# Patient Record
Sex: FEMALE | Race: BLACK OR AFRICAN AMERICAN | NOT HISPANIC OR LATINO | Employment: FULL TIME | URBAN - METROPOLITAN AREA
[De-identification: names, ages, dates, MRNs, and addresses within clinical notes are randomized per-mention and may not be internally consistent; named-entity substitution may affect disease eponyms.]

---

## 2023-04-02 ENCOUNTER — OFFICE VISIT (OUTPATIENT)
Dept: URGENT CARE | Facility: CLINIC | Age: 48
End: 2023-04-02
Payer: COMMERCIAL

## 2023-04-02 VITALS
OXYGEN SATURATION: 98 % | HEART RATE: 89 BPM | TEMPERATURE: 100 F | RESPIRATION RATE: 18 BRPM | HEIGHT: 65 IN | DIASTOLIC BLOOD PRESSURE: 85 MMHG | WEIGHT: 151.81 LBS | BODY MASS INDEX: 25.29 KG/M2 | SYSTOLIC BLOOD PRESSURE: 119 MMHG

## 2023-04-02 DIAGNOSIS — M25.562 ACUTE PAIN OF LEFT KNEE: Primary | ICD-10-CM
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
## 2023-04-02 PROCEDURE — 96372 PR INJECTION,THERAP/PROPH/DIAG2ST, IM OR SUBCUT: ICD-10-PCS | Mod: S$GLB,,, | Performed by: NURSE PRACTITIONER

## 2023-04-02 PROCEDURE — 99204 PR OFFICE/OUTPT VISIT, NEW, LEVL IV, 45-59 MIN: ICD-10-PCS | Mod: 25,S$GLB,, | Performed by: NURSE PRACTITIONER

## 2023-04-02 PROCEDURE — 73562 X-RAY EXAM OF KNEE 3: CPT | Mod: LT,S$GLB,, | Performed by: RADIOLOGY

## 2023-04-02 PROCEDURE — 96372 THER/PROPH/DIAG INJ SC/IM: CPT | Mod: S$GLB,,, | Performed by: NURSE PRACTITIONER

## 2023-04-02 PROCEDURE — 73562 XR KNEE 3 VIEW LEFT: ICD-10-PCS | Mod: LT,S$GLB,, | Performed by: RADIOLOGY

## 2023-04-02 PROCEDURE — 99204 OFFICE O/P NEW MOD 45 MIN: CPT | Mod: 25,S$GLB,, | Performed by: NURSE PRACTITIONER

## 2023-04-02 RX ORDER — CELECOXIB 200 MG/1
200 CAPSULE ORAL 2 TIMES DAILY PRN
Qty: 14 CAPSULE | Refills: 0 | Status: SHIPPED | OUTPATIENT
Start: 2023-04-02 | End: 2023-04-09

## 2023-04-02 RX ORDER — KETOROLAC TROMETHAMINE 30 MG/ML
30 INJECTION, SOLUTION INTRAMUSCULAR; INTRAVENOUS
Status: COMPLETED | OUTPATIENT
Start: 2023-04-02 | End: 2023-04-02

## 2023-04-02 RX ADMIN — KETOROLAC TROMETHAMINE 30 MG: 30 INJECTION, SOLUTION INTRAMUSCULAR; INTRAVENOUS at 03:04
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
## 2023-06-26 ENCOUNTER — HOSPITAL ENCOUNTER (EMERGENCY)
Facility: HOSPITAL | Age: 48
Discharge: HOME | End: 2023-06-26
Attending: EMERGENCY MEDICINE
Payer: COMMERCIAL

## 2023-06-26 VITALS
RESPIRATION RATE: 18 BRPM | HEIGHT: 65 IN | BODY MASS INDEX: 24.32 KG/M2 | SYSTOLIC BLOOD PRESSURE: 141 MMHG | TEMPERATURE: 99 F | OXYGEN SATURATION: 97 % | WEIGHT: 146 LBS | HEART RATE: 61 BPM | DIASTOLIC BLOOD PRESSURE: 67 MMHG

## 2023-06-26 DIAGNOSIS — A08.4 VIRAL GASTROENTERITIS: Primary | ICD-10-CM

## 2023-06-26 DIAGNOSIS — R10.33 PERIUMBILICAL PAIN: ICD-10-CM

## 2023-06-26 LAB
ALBUMIN SERPL BCP-MCNC: 3.8 G/DL (ref 3.5–5.2)
ALP SERPL-CCNC: 56 U/L (ref 55–135)
ALT SERPL W/O P-5'-P-CCNC: 15 U/L (ref 10–44)
ANION GAP SERPL CALC-SCNC: 9 MMOL/L (ref 8–16)
AST SERPL-CCNC: 13 U/L (ref 10–40)
B-HCG UR QL: NEGATIVE
BACTERIA #/AREA URNS HPF: ABNORMAL /HPF
BASOPHILS # BLD AUTO: 0.04 K/UL (ref 0–0.2)
BASOPHILS NFR BLD: 0.4 % (ref 0–1.9)
BILIRUB SERPL-MCNC: 0.2 MG/DL (ref 0.1–1)
BILIRUB UR QL STRIP: NEGATIVE
BUN SERPL-MCNC: 10 MG/DL (ref 6–20)
CALCIUM SERPL-MCNC: 9.1 MG/DL (ref 8.7–10.5)
CHLORIDE SERPL-SCNC: 106 MMOL/L (ref 95–110)
CLARITY UR: ABNORMAL
CO2 SERPL-SCNC: 24 MMOL/L (ref 23–29)
COLOR UR: YELLOW
CREAT SERPL-MCNC: 0.8 MG/DL (ref 0.5–1.4)
DIFFERENTIAL METHOD: ABNORMAL
EOSINOPHIL # BLD AUTO: 0 K/UL (ref 0–0.5)
EOSINOPHIL NFR BLD: 0.2 % (ref 0–8)
ERYTHROCYTE [DISTWIDTH] IN BLOOD BY AUTOMATED COUNT: 16.2 % (ref 11.5–14.5)
EST. GFR  (NO RACE VARIABLE): >60 ML/MIN/1.73 M^2
GLUCOSE SERPL-MCNC: 104 MG/DL (ref 70–110)
GLUCOSE UR QL STRIP: NEGATIVE
HCT VFR BLD AUTO: 38.5 % (ref 37–48.5)
HGB BLD-MCNC: 12.2 G/DL (ref 12–16)
HGB UR QL STRIP: ABNORMAL
HYALINE CASTS #/AREA URNS LPF: 0 /LPF
IMM GRANULOCYTES # BLD AUTO: 0.02 K/UL (ref 0–0.04)
IMM GRANULOCYTES NFR BLD AUTO: 0.2 % (ref 0–0.5)
KETONES UR QL STRIP: NEGATIVE
LEUKOCYTE ESTERASE UR QL STRIP: NEGATIVE
LYMPHOCYTES # BLD AUTO: 1.2 K/UL (ref 1–4.8)
LYMPHOCYTES NFR BLD: 12.9 % (ref 18–48)
MCH RBC QN AUTO: 22.8 PG (ref 27–31)
MCHC RBC AUTO-ENTMCNC: 31.7 G/DL (ref 32–36)
MCV RBC AUTO: 72 FL (ref 82–98)
MICROSCOPIC COMMENT: ABNORMAL
MONOCYTES # BLD AUTO: 0.5 K/UL (ref 0.3–1)
MONOCYTES NFR BLD: 6 % (ref 4–15)
NEUTROPHILS # BLD AUTO: 7.3 K/UL (ref 1.8–7.7)
NEUTROPHILS NFR BLD: 80.3 % (ref 38–73)
NITRITE UR QL STRIP: NEGATIVE
NRBC BLD-RTO: 0 /100 WBC
PH UR STRIP: 6 [PH] (ref 5–8)
PLATELET # BLD AUTO: 296 K/UL (ref 150–450)
PMV BLD AUTO: 9.5 FL (ref 9.2–12.9)
POTASSIUM SERPL-SCNC: 4 MMOL/L (ref 3.5–5.1)
PROT SERPL-MCNC: 7 G/DL (ref 6–8.4)
PROT UR QL STRIP: ABNORMAL
RBC # BLD AUTO: 5.34 M/UL (ref 4–5.4)
RBC #/AREA URNS HPF: >100 /HPF (ref 0–4)
SODIUM SERPL-SCNC: 139 MMOL/L (ref 136–145)
SP GR UR STRIP: 1.03 (ref 1–1.03)
SQUAMOUS #/AREA URNS HPF: 7 /HPF
URN SPEC COLLECT METH UR: ABNORMAL
UROBILINOGEN UR STRIP-ACNC: NEGATIVE EU/DL
WBC # BLD AUTO: 9.04 K/UL (ref 3.9–12.7)
WBC #/AREA URNS HPF: 5 /HPF (ref 0–5)

## 2023-06-26 PROCEDURE — 96374 THER/PROPH/DIAG INJ IV PUSH: CPT

## 2023-06-26 PROCEDURE — 81000 URINALYSIS NONAUTO W/SCOPE: CPT | Performed by: EMERGENCY MEDICINE

## 2023-06-26 PROCEDURE — 96375 TX/PRO/DX INJ NEW DRUG ADDON: CPT

## 2023-06-26 PROCEDURE — 36415 COLL VENOUS BLD VENIPUNCTURE: CPT | Performed by: EMERGENCY MEDICINE

## 2023-06-26 PROCEDURE — 80053 COMPREHEN METABOLIC PANEL: CPT | Performed by: EMERGENCY MEDICINE

## 2023-06-26 PROCEDURE — 85025 COMPLETE CBC W/AUTO DIFF WBC: CPT | Performed by: EMERGENCY MEDICINE

## 2023-06-26 PROCEDURE — 63600175 PHARM REV CODE 636 W HCPCS: Performed by: EMERGENCY MEDICINE

## 2023-06-26 PROCEDURE — 81025 URINE PREGNANCY TEST: CPT | Performed by: EMERGENCY MEDICINE

## 2023-06-26 PROCEDURE — 96361 HYDRATE IV INFUSION ADD-ON: CPT

## 2023-06-26 PROCEDURE — 99284 EMERGENCY DEPT VISIT MOD MDM: CPT | Mod: 25

## 2023-06-26 RX ORDER — DIPHENHYDRAMINE HYDROCHLORIDE 50 MG/ML
25 INJECTION INTRAMUSCULAR; INTRAVENOUS
Status: COMPLETED | OUTPATIENT
Start: 2023-06-26 | End: 2023-06-26

## 2023-06-26 RX ORDER — ONDANSETRON HYDROCHLORIDE 8 MG/1
8 TABLET, FILM COATED ORAL EVERY 12 HOURS PRN
Qty: 8 TABLET | Refills: 1 | Status: SHIPPED | OUTPATIENT
Start: 2023-06-26 | End: 2024-03-28

## 2023-06-26 RX ORDER — KETOROLAC TROMETHAMINE 30 MG/ML
30 INJECTION, SOLUTION INTRAMUSCULAR; INTRAVENOUS
Status: COMPLETED | OUTPATIENT
Start: 2023-06-26 | End: 2023-06-26

## 2023-06-26 RX ORDER — HYOSCYAMINE SULFATE 0.5 MG/ML
0.5 INJECTION, SOLUTION SUBCUTANEOUS
Status: COMPLETED | OUTPATIENT
Start: 2023-06-26 | End: 2023-06-26

## 2023-06-26 RX ORDER — HYOSCYAMINE SULFATE 0.125 MG
375 TABLET ORAL EVERY 6 HOURS PRN
Qty: 20 TABLET | Refills: 1 | Status: SHIPPED | OUTPATIENT
Start: 2023-06-26 | End: 2024-03-28

## 2023-06-26 RX ORDER — DROPERIDOL 2.5 MG/ML
2.5 INJECTION, SOLUTION INTRAMUSCULAR; INTRAVENOUS
Status: COMPLETED | OUTPATIENT
Start: 2023-06-26 | End: 2023-06-26

## 2023-06-26 RX ADMIN — SODIUM CHLORIDE, POTASSIUM CHLORIDE, SODIUM LACTATE AND CALCIUM CHLORIDE 1000 ML: 600; 310; 30; 20 INJECTION, SOLUTION INTRAVENOUS at 08:06

## 2023-06-26 RX ADMIN — HYOSCYAMINE SULFATE 0.5 MG: 0.5 INJECTION, SOLUTION SUBCUTANEOUS at 08:06

## 2023-06-26 RX ADMIN — DIPHENHYDRAMINE HYDROCHLORIDE 25 MG: 50 INJECTION INTRAMUSCULAR; INTRAVENOUS at 08:06

## 2023-06-26 RX ADMIN — KETOROLAC TROMETHAMINE 30 MG: 30 INJECTION, SOLUTION INTRAMUSCULAR; INTRAVENOUS at 08:06

## 2023-06-26 RX ADMIN — DROPERIDOL 2.5 MG: 2.5 INJECTION, SOLUTION INTRAMUSCULAR; INTRAVENOUS at 08:06

## 2023-06-26 NOTE — ED PROVIDER NOTES
"Encounter Date: 6/26/2023    SCRIBE #1 NOTE: I, Michael Epperson, am scribing for, and in the presence of,  Salvatore Lopez III, MD.     History     Chief Complaint   Patient presents with    Abdominal Pain     Across lower abd. Started 0100 this am     Nausea    Vomiting     X 2 at 0430     Time seen by provider: 8:07 AM on 06/26/2023    Elena Ingram is a 48 y.o. female who presents to the ED with an onset of persistent lower abdominal pain, nausea, vomiting, and diarrhea that began 7 hours ago at 1 AM. She has taken aleve and ibuprofen without relief of her pain. She denies a history of abdominal surgeries, kidney stones, or diverticulitis. The patient denies dysuria, difficulty urinating, or any other symptoms at this time. She states it is not likely she is pregnant because she has underwent a tubal ligation. She states she is allergic to compazine because it makes her neck twitch. There is no recorded PMHx or PSHx.    The history is provided by the patient.   Review of patient's allergies indicates:   Allergen Reactions    Prochlorperazine Other (See Comments)     Other reaction(s): Muscle Pain, Unknown, Unknown  Pt reports drug as "Making my neck twitch".  neck twitching  "Makes her neck twitch"  Pt reports drug as "Making my neck twitch".  Pt reports drug as "Making my neck twitch".  "Makes her neck twitch"  Pt reports drug as "Making my neck twitch".      Oxycodone-acetaminophen Nausea And Vomiting and Other (See Comments)     No past medical history on file.  No past surgical history on file.  No family history on file.     Review of Systems   Constitutional:  Negative for activity change, appetite change, chills, fatigue and fever.   Eyes:  Negative for visual disturbance.   Respiratory:  Negative for apnea and shortness of breath.    Cardiovascular:  Negative for chest pain and palpitations.   Gastrointestinal:  Positive for abdominal pain, diarrhea, nausea and vomiting. Negative for abdominal " distention.   Genitourinary:  Negative for difficulty urinating and dysuria.   Musculoskeletal:  Negative for neck pain.   Skin:  Negative for pallor and rash.   Neurological:  Negative for headaches.   Hematological:  Does not bruise/bleed easily.   Psychiatric/Behavioral:  Negative for agitation.      Physical Exam     Initial Vitals [06/26/23 0753]   BP Pulse Resp Temp SpO2   (!) 141/67 61 18 98.6 °F (37 °C) 97 %      MAP       --         Physical Exam    Nursing note and vitals reviewed.  Constitutional: She appears well-developed and well-nourished.   HENT:   Head: Normocephalic and atraumatic.   Eyes: Conjunctivae are normal.   Neck: Neck supple.   Normal range of motion.  Cardiovascular:  Normal rate, regular rhythm and normal heart sounds.     Exam reveals no gallop and no friction rub.       No murmur heard.  Pulmonary/Chest: Effort normal and breath sounds normal. No respiratory distress. She has no wheezes. She has no rhonchi. She has no rales.   Abdominal: Abdomen is soft. She exhibits no distension. There is abdominal tenderness in the right lower quadrant and left lower quadrant.   No right CVA tenderness.  No left CVA tenderness. There is no rebound and no guarding.   Musculoskeletal:         General: Normal range of motion.      Cervical back: Normal range of motion and neck supple.     Neurological: She is alert and oriented to person, place, and time.   Skin: Skin is warm and dry. No erythema.   Psychiatric: She has a normal mood and affect.       ED Course   Procedures  Labs Reviewed   URINALYSIS, REFLEX TO URINE CULTURE - Abnormal; Notable for the following components:       Result Value    Appearance, UA Cloudy (*)     Protein, UA 1+ (*)     Occult Blood UA 3+ (*)     All other components within normal limits    Narrative:     Specimen Source->Urine   CBC W/ AUTO DIFFERENTIAL - Abnormal; Notable for the following components:    MCV 72 (*)     MCH 22.8 (*)     MCHC 31.7 (*)     RDW 16.2 (*)      Gran % 80.3 (*)     Lymph % 12.9 (*)     All other components within normal limits   URINALYSIS MICROSCOPIC - Abnormal; Notable for the following components:    RBC, UA >100 (*)     All other components within normal limits    Narrative:     Specimen Source->Urine   COMPREHENSIVE METABOLIC PANEL   PREGNANCY TEST, URINE RAPID    Narrative:     Specimen Source->Urine          Imaging Results              CT Abdomen Pelvis  Without Contrast (Final result)  Result time 06/26/23 09:06:42      Final result by Osbaldo Benson MD (06/26/23 09:06:42)                   Impression:      No acute findings in the abdomen including normal appendix      Electronically signed by: Osbaldo Benson  Date:    06/26/2023  Time:    09:06               Narrative:    EXAMINATION:  CT ABDOMEN PELVIS WITHOUT CONTRAST    CLINICAL HISTORY:  RLQ abdominal pain (Age >= 14y); Periumbilical pain    TECHNIQUE:  Low dose axial images, sagittal and coronal reformations were obtained from the lung bases to the pubic symphysis.  Oral contrast was not administered.    COMPARISON:  None    FINDINGS:  There is some nonspecific cystic spaces in the lung bases.  Normal size heart.  Unremarkable gallbladder.    Solid abdominal organs including liver spleen pancreas adrenal glands and kidneys are grossly unremarkable on this non IV contrasted exam.    There is no enteric contrast which limits bowel assessment.  No dilated bowel loops.  Normal appendix.    Essure coils.  Decompressed urinary bladder.  Trace low density pelvic free fluid in the posterior cul-de-sac.    No acute osseous findings.                                       Medications   lactated ringers bolus 1,000 mL (1,000 mLs Intravenous New Bag 6/26/23 0840)   diphenhydrAMINE injection 25 mg (25 mg Intravenous Given 6/26/23 0839)   ketorolac injection 30 mg (30 mg Intravenous Given 6/26/23 0839)   hyoscyamine injection 0.5 mg (0.5 mg Intravenous Given 6/26/23 0839)   droPERidol injection 2.5  mg (2.5 mg Intravenous Given 6/26/23 0812)     Medical Decision Making:   History:   Old Medical Records: I decided to obtain old medical records.  Clinical Tests:   Lab Tests: Ordered and Reviewed  ED Management:  48-year-old female presents with nausea, vomiting and diffuse crampy abdominal pain.  CT of the abdomen and pelvis fails to demonstrate any acute intra-abdominal pathology.  The symptoms appear to reflect a viral gastroenteritis.  There is no evidence of bowel obstruction, diverticulitis or appendicitis.     APC / Resident Notes:   I, Dr. Salvatore Lopez III, personally performed the services described in this documentation. All medical record entries made by the scribe were at my direction and in my presence.  I have reviewed the chart and agree that the record reflects my personal performance and is accurate and complete   Scribe Attestation:   Scribe #1: I performed the above scribed service and the documentation accurately describes the services I performed. I attest to the accuracy of the note.                   Clinical Impression:   Final diagnoses:  [R10.33] Periumbilical pain  [A08.4] Viral gastroenteritis (Primary)        ED Disposition Condition    Discharge Stable          ED Prescriptions       Medication Sig Dispense Start Date End Date Auth. Provider    hyoscyamine (ANASPAZ,LEVSIN) 0.125 mg Tab Take 3 tablets (375 mcg total) by mouth every 6 (six) hours as needed. 20 tablet 6/26/2023 7/26/2023 Salvatore Lopez III, MD    ondansetron (ZOFRAN) 8 MG tablet Take 1 tablet (8 mg total) by mouth every 12 (twelve) hours as needed for Nausea. 8 tablet 6/26/2023 -- Salvatore Lopez III, MD          Follow-up Information       Follow up With Specialties Details Why Contact Info    Juaquin Diane MD Family Medicine In 3 days  [address] [facility]  [place] [address]  [phone]               Salvatore Lopez III, MD  06/26/23 1829

## 2023-06-27 NOTE — PATIENT INSTRUCTIONS
Rest, and elevate right ankle until better  Apply ice for 15 minutes every 2 hours. After 48 hours, may use moist heat or heating pad  Wear knee immobilizer at all times for comfort and stability.  You may remove it as needed for showering and then reapply  Start Celebrex as needed for pain.  Do not take any additional NSAIDs while taking this.  You may take additional Tylenol as needed  Follow up with PCP and orthopedics for re-evaluation and referral as needed  Go directly to the ER for any emergent concerns  Return to clinic as needed for new or worse symptoms.     Doxepin Pregnancy And Lactation Text: This medication is Pregnancy Category C and it isn't known if it is safe during pregnancy. It is also excreted in breast milk and breast feeding isn't recommended.

## 2023-12-24 ENCOUNTER — OFFICE VISIT (OUTPATIENT)
Dept: URGENT CARE | Facility: CLINIC | Age: 48
End: 2023-12-24
Payer: COMMERCIAL

## 2023-12-24 VITALS
OXYGEN SATURATION: 97 % | TEMPERATURE: 98 F | RESPIRATION RATE: 18 BRPM | BODY MASS INDEX: 25.33 KG/M2 | DIASTOLIC BLOOD PRESSURE: 67 MMHG | WEIGHT: 152 LBS | HEIGHT: 65 IN | SYSTOLIC BLOOD PRESSURE: 115 MMHG | HEART RATE: 80 BPM

## 2023-12-24 DIAGNOSIS — B00.1 FEVER BLISTER: ICD-10-CM

## 2023-12-24 DIAGNOSIS — B96.89 ACUTE BACTERIAL SINUSITIS: ICD-10-CM

## 2023-12-24 DIAGNOSIS — R05.9 COUGH, UNSPECIFIED TYPE: Primary | ICD-10-CM

## 2023-12-24 DIAGNOSIS — J01.90 ACUTE BACTERIAL SINUSITIS: ICD-10-CM

## 2023-12-24 LAB
CTP QC/QA: YES
CTP QC/QA: YES
FLUAV AG NPH QL: NEGATIVE
FLUBV AG NPH QL: NEGATIVE
SARS-COV-2 AG RESP QL IA.RAPID: NEGATIVE

## 2023-12-24 PROCEDURE — 87804 INFLUENZA ASSAY W/OPTIC: CPT | Mod: QW,,, | Performed by: NURSE PRACTITIONER

## 2023-12-24 PROCEDURE — 87811 SARS-COV-2 COVID19 W/OPTIC: CPT | Mod: QW,S$GLB,, | Performed by: EMERGENCY MEDICINE

## 2023-12-24 PROCEDURE — 99214 PR OFFICE/OUTPT VISIT, EST, LEVL IV, 30-39 MIN: ICD-10-PCS | Mod: 25,S$GLB,, | Performed by: EMERGENCY MEDICINE

## 2023-12-24 PROCEDURE — 87811 SARS CORONAVIRUS 2 ANTIGEN POCT, MANUAL READ: ICD-10-PCS | Mod: QW,S$GLB,, | Performed by: EMERGENCY MEDICINE

## 2023-12-24 PROCEDURE — 99214 OFFICE O/P EST MOD 30 MIN: CPT | Mod: 25,S$GLB,, | Performed by: EMERGENCY MEDICINE

## 2023-12-24 PROCEDURE — 87804 POCT INFLUENZA A/B: ICD-10-PCS | Mod: QW,,, | Performed by: NURSE PRACTITIONER

## 2023-12-24 RX ORDER — BENZONATATE 100 MG/1
100 CAPSULE ORAL 3 TIMES DAILY PRN
Qty: 30 CAPSULE | Refills: 0 | Status: SHIPPED | OUTPATIENT
Start: 2023-12-24 | End: 2024-01-03

## 2023-12-24 RX ORDER — AMOXICILLIN AND CLAVULANATE POTASSIUM 875; 125 MG/1; MG/1
1 TABLET, FILM COATED ORAL EVERY 12 HOURS
Qty: 14 TABLET | Refills: 0 | Status: SHIPPED | OUTPATIENT
Start: 2023-12-24 | End: 2023-12-31

## 2023-12-24 RX ORDER — AZELASTINE 1 MG/ML
1 SPRAY, METERED NASAL 2 TIMES DAILY
Qty: 30 ML | Refills: 0 | Status: SHIPPED | OUTPATIENT
Start: 2023-12-24 | End: 2024-03-28

## 2023-12-24 RX ORDER — FLUTICASONE PROPIONATE 50 MCG
1 SPRAY, SUSPENSION (ML) NASAL DAILY
Qty: 15.8 ML | Refills: 0 | Status: SHIPPED | OUTPATIENT
Start: 2023-12-24 | End: 2024-03-28

## 2023-12-24 RX ORDER — VALACYCLOVIR HYDROCHLORIDE 1 G/1
2000 TABLET, FILM COATED ORAL EVERY 12 HOURS
Qty: 4 TABLET | Refills: 0 | Status: ON HOLD | OUTPATIENT
Start: 2023-12-24 | End: 2024-04-01 | Stop reason: HOSPADM

## 2023-12-24 NOTE — PATIENT INSTRUCTIONS
Augmentin twice a day for 7 days.      Please take a probiotic while you are on antibiotics plus a few weeks. Examples of probiotics include brand names such as Align, Floraster and Culturelle, but generic versions are ok too. Kefir is a milk product that is also an excellent probiotic and can be taken as 1/4 cup three times a day with meals.      Symptomatic treatment to include:    Rest, increase fluid intake to thin mucus, include electrolyte replacement  Ibuprofen/Tylenol as directed for fever, sore throat, body aches  Zrytec 10 mg daily until prescription complete  Flonase daily until bottle empty  Astelin twice daily until congestion resolves, then just as needed  guaifenesin 1200 mg twice daily for 10 days to thin mucus  Tessalon Perles cough pills best use for cough caused by postnasal drip/throat irritation  Refrain from using Afrin nasal spray or any decongestants as this will thicken mucous   Nasal saline spray several times a day  or nasal wash systems  Warm, salt water gargles, over the counter throat lozenges or sprays for sore throat.

## 2023-12-24 NOTE — PROGRESS NOTES
"Subjective:      Patient ID: Elena Ingram is a 48 y.o. female.    Vitals:  height is 5' 5" (1.651 m) and weight is 68.9 kg (152 lb). Her oral temperature is 98.1 °F (36.7 °C). Her blood pressure is 115/67 and her pulse is 80. Her respiration is 18 and oxygen saturation is 97%.     Chief Complaint: Cough and Nasal Congestion    Cough  This is a new problem. Episode onset: 2 weeks. The problem has been unchanged. The problem occurs hourly. The cough is Productive of purulent sputum. Associated symptoms include nasal congestion, postnasal drip and a sore throat. Pertinent negatives include no chills, ear pain or fever. Nothing aggravates the symptoms. She has tried OTC cough suppressant for the symptoms. The treatment provided mild relief.       Constitution: Negative for chills, fatigue and fever.   HENT:  Positive for congestion, postnasal drip and sore throat. Negative for ear pain.    Respiratory:  Positive for cough (Worse at night and 1st thing in the morning) and sputum production (green).    Gastrointestinal:  Negative for vomiting and diarrhea.      Objective:     Physical Exam   Constitutional: She is oriented to person, place, and time. She is cooperative.  Non-toxic appearance. She does not appear ill. No distress. awake  HENT:   Head: Normocephalic and atraumatic.   Ears:   Right Ear: Tympanic membrane, external ear and ear canal normal.   Left Ear: Tympanic membrane, external ear and ear canal normal.   Nose: Congestion present. No rhinorrhea.   Mouth/Throat: Mucous membranes are moist. Posterior oropharyngeal erythema present. No oropharyngeal exudate.   Eyes: Conjunctivae are normal. Right eye exhibits no discharge. Left eye exhibits no discharge.   Neck: Neck supple. No neck rigidity present.   Cardiovascular: Normal rate, regular rhythm and normal heart sounds.   Pulmonary/Chest: Effort normal and breath sounds normal. No accessory muscle usage. No tachypnea. No respiratory distress. She has no " wheezes. She has no rhonchi. She has no rales. She exhibits no tenderness.   Abdominal: Normal appearance.   Musculoskeletal:      Cervical back: She exhibits no tenderness.   Lymphadenopathy:     She has no cervical adenopathy.   Neurological: no focal deficit. She is alert and oriented to person, place, and time. No sensory deficit.   Skin: Skin is warm, dry, not diaphoretic and no rash. Capillary refill takes 2 to 3 seconds.   Psychiatric: Her behavior is normal. Mood normal.   Nursing note and vitals reviewed.      Assessment:     1. Cough, unspecified type    2. Acute bacterial sinusitis    3. Fever blister      COVID negative  Flu a/B negative    Plan:       Cough, unspecified type  -     POCT Influenza A/B  -     SARS Coronavirus 2 Antigen, POCT Manual Read    Acute bacterial sinusitis  -     azelastine (ASTELIN) 137 mcg (0.1 %) nasal spray; 1 spray (137 mcg total) by Nasal route 2 (two) times daily.  Dispense: 30 mL; Refill: 0  -     fluticasone propionate (FLONASE) 50 mcg/actuation nasal spray; 1 spray (50 mcg total) by Each Nostril route once daily.  Dispense: 15.8 mL; Refill: 0  -     benzonatate (TESSALON) 100 MG capsule; Take 1 capsule (100 mg total) by mouth 3 (three) times daily as needed for Cough.  Dispense: 30 capsule; Refill: 0  -     amoxicillin-clavulanate 875-125mg (AUGMENTIN) 875-125 mg per tablet; Take 1 tablet by mouth every 12 (twelve) hours. for 7 days  Dispense: 14 tablet; Refill: 0    Fever blister  -     valACYclovir (VALTREX) 1000 MG tablet; Take 2 tablets (2,000 mg total) by mouth every 12 (twelve) hours.  Dispense: 4 tablet; Refill: 0

## 2024-03-25 ENCOUNTER — HOSPITAL ENCOUNTER (EMERGENCY)
Facility: HOSPITAL | Age: 49
Discharge: HOME | End: 2024-03-25
Attending: EMERGENCY MEDICINE
Payer: COMMERCIAL

## 2024-03-25 VITALS
HEART RATE: 72 BPM | HEIGHT: 65 IN | WEIGHT: 152 LBS | OXYGEN SATURATION: 98 % | DIASTOLIC BLOOD PRESSURE: 78 MMHG | TEMPERATURE: 99 F | RESPIRATION RATE: 18 BRPM | SYSTOLIC BLOOD PRESSURE: 123 MMHG | BODY MASS INDEX: 25.33 KG/M2

## 2024-03-25 DIAGNOSIS — N85.8 ABNORMAL COLLECTION OF FLUID IN UTERINE CAVITY: Primary | ICD-10-CM

## 2024-03-25 LAB
ALBUMIN SERPL BCP-MCNC: 3.9 G/DL (ref 3.5–5.2)
ALP SERPL-CCNC: 59 U/L (ref 55–135)
ALT SERPL W/O P-5'-P-CCNC: 15 U/L (ref 10–44)
ANION GAP SERPL CALC-SCNC: 11 MMOL/L (ref 8–16)
AST SERPL-CCNC: 12 U/L (ref 10–40)
B-HCG UR QL: NEGATIVE
BASOPHILS # BLD AUTO: 0.03 K/UL (ref 0–0.2)
BASOPHILS NFR BLD: 0.3 % (ref 0–1.9)
BILIRUB SERPL-MCNC: 0.3 MG/DL (ref 0.1–1)
BILIRUB UR QL STRIP: NEGATIVE
BUN SERPL-MCNC: 9 MG/DL (ref 6–20)
CALCIUM SERPL-MCNC: 9.7 MG/DL (ref 8.7–10.5)
CHLORIDE SERPL-SCNC: 103 MMOL/L (ref 95–110)
CLARITY UR: CLEAR
CO2 SERPL-SCNC: 21 MMOL/L (ref 23–29)
COLOR UR: COLORLESS
CREAT SERPL-MCNC: 0.8 MG/DL (ref 0.5–1.4)
CTP QC/QA: YES
DIFFERENTIAL METHOD BLD: ABNORMAL
EOSINOPHIL # BLD AUTO: 0 K/UL (ref 0–0.5)
EOSINOPHIL NFR BLD: 0 % (ref 0–8)
ERYTHROCYTE [DISTWIDTH] IN BLOOD BY AUTOMATED COUNT: 15.7 % (ref 11.5–14.5)
EST. GFR  (NO RACE VARIABLE): >60 ML/MIN/1.73 M^2
GLUCOSE SERPL-MCNC: 134 MG/DL (ref 70–110)
GLUCOSE UR QL STRIP: NEGATIVE
HCT VFR BLD AUTO: 41.4 % (ref 37–48.5)
HGB BLD-MCNC: 13.2 G/DL (ref 12–16)
HGB UR QL STRIP: NEGATIVE
IMM GRANULOCYTES # BLD AUTO: 0.01 K/UL (ref 0–0.04)
IMM GRANULOCYTES NFR BLD AUTO: 0.1 % (ref 0–0.5)
KETONES UR QL STRIP: NEGATIVE
LEUKOCYTE ESTERASE UR QL STRIP: NEGATIVE
LIPASE SERPL-CCNC: 26 U/L (ref 4–60)
LYMPHOCYTES # BLD AUTO: 1.3 K/UL (ref 1–4.8)
LYMPHOCYTES NFR BLD: 14.9 % (ref 18–48)
MCH RBC QN AUTO: 23 PG (ref 27–31)
MCHC RBC AUTO-ENTMCNC: 31.9 G/DL (ref 32–36)
MCV RBC AUTO: 72 FL (ref 82–98)
MONOCYTES # BLD AUTO: 0.5 K/UL (ref 0.3–1)
MONOCYTES NFR BLD: 5.4 % (ref 4–15)
NEUTROPHILS # BLD AUTO: 7.1 K/UL (ref 1.8–7.7)
NEUTROPHILS NFR BLD: 79.3 % (ref 38–73)
NITRITE UR QL STRIP: NEGATIVE
NRBC BLD-RTO: 0 /100 WBC
PH UR STRIP: 8 [PH] (ref 5–8)
PLATELET # BLD AUTO: 314 K/UL (ref 150–450)
PMV BLD AUTO: 9.4 FL (ref 9.2–12.9)
POTASSIUM SERPL-SCNC: 3.9 MMOL/L (ref 3.5–5.1)
PROT SERPL-MCNC: 7.8 G/DL (ref 6–8.4)
PROT UR QL STRIP: NEGATIVE
RBC # BLD AUTO: 5.75 M/UL (ref 4–5.4)
SODIUM SERPL-SCNC: 135 MMOL/L (ref 136–145)
SP GR UR STRIP: >1.03 (ref 1–1.03)
URN SPEC COLLECT METH UR: ABNORMAL
UROBILINOGEN UR STRIP-ACNC: NEGATIVE EU/DL
WBC # BLD AUTO: 8.97 K/UL (ref 3.9–12.7)

## 2024-03-25 PROCEDURE — 81003 URINALYSIS AUTO W/O SCOPE: CPT | Performed by: EMERGENCY MEDICINE

## 2024-03-25 PROCEDURE — 63600175 PHARM REV CODE 636 W HCPCS: Performed by: EMERGENCY MEDICINE

## 2024-03-25 PROCEDURE — 96375 TX/PRO/DX INJ NEW DRUG ADDON: CPT

## 2024-03-25 PROCEDURE — 81025 URINE PREGNANCY TEST: CPT | Performed by: EMERGENCY MEDICINE

## 2024-03-25 PROCEDURE — 25500020 PHARM REV CODE 255

## 2024-03-25 PROCEDURE — 36415 COLL VENOUS BLD VENIPUNCTURE: CPT | Performed by: EMERGENCY MEDICINE

## 2024-03-25 PROCEDURE — 99285 EMERGENCY DEPT VISIT HI MDM: CPT | Mod: 25

## 2024-03-25 PROCEDURE — 96374 THER/PROPH/DIAG INJ IV PUSH: CPT | Mod: 59

## 2024-03-25 PROCEDURE — 80053 COMPREHEN METABOLIC PANEL: CPT | Performed by: EMERGENCY MEDICINE

## 2024-03-25 PROCEDURE — 83690 ASSAY OF LIPASE: CPT | Performed by: EMERGENCY MEDICINE

## 2024-03-25 PROCEDURE — 85025 COMPLETE CBC W/AUTO DIFF WBC: CPT | Performed by: EMERGENCY MEDICINE

## 2024-03-25 RX ORDER — ONDANSETRON HYDROCHLORIDE 2 MG/ML
4 INJECTION, SOLUTION INTRAVENOUS
Status: COMPLETED | OUTPATIENT
Start: 2024-03-25 | End: 2024-03-25

## 2024-03-25 RX ORDER — ONDANSETRON 4 MG/1
4 TABLET, ORALLY DISINTEGRATING ORAL EVERY 6 HOURS PRN
Qty: 15 TABLET | Refills: 0 | Status: ON HOLD | OUTPATIENT
Start: 2024-03-25 | End: 2024-04-01 | Stop reason: HOSPADM

## 2024-03-25 RX ORDER — HYDROCODONE BITARTRATE AND ACETAMINOPHEN 5; 325 MG/1; MG/1
1 TABLET ORAL EVERY 6 HOURS PRN
Qty: 12 TABLET | Refills: 0 | Status: SHIPPED | OUTPATIENT
Start: 2024-03-25 | End: 2024-03-28

## 2024-03-25 RX ORDER — MORPHINE SULFATE 2 MG/ML
6 INJECTION, SOLUTION INTRAMUSCULAR; INTRAVENOUS
Status: COMPLETED | OUTPATIENT
Start: 2024-03-25 | End: 2024-03-25

## 2024-03-25 RX ADMIN — MORPHINE SULFATE 6 MG: 2 INJECTION, SOLUTION INTRAMUSCULAR; INTRAVENOUS at 09:03

## 2024-03-25 RX ADMIN — ONDANSETRON 4 MG: 2 INJECTION INTRAMUSCULAR; INTRAVENOUS at 09:03

## 2024-03-25 RX ADMIN — IOHEXOL 75 ML: 350 INJECTION, SOLUTION INTRAVENOUS at 09:03

## 2024-03-25 NOTE — ED PROVIDER NOTES
"Encounter Date: 3/25/2024       History     Chief Complaint   Patient presents with    Abdominal Pain     Rt. Lower  , started 12 midnight     Vomiting     HPI 49-year-old woman who presents emergency department for severe right lower quadrant abdominal pain that began around midnight last night.  She has had associated nausea and vomiting.  No diarrhea, dysuria or fever.  Review of patient's allergies indicates:   Allergen Reactions    Prochlorperazine Other (See Comments)     Other reaction(s): Muscle Pain, Unknown, Unknown  Pt reports drug as "Making my neck twitch".  neck twitching  "Makes her neck twitch"  Pt reports drug as "Making my neck twitch".  Pt reports drug as "Making my neck twitch".  "Makes her neck twitch"  Pt reports drug as "Making my neck twitch".      Oxycodone-acetaminophen Nausea And Vomiting and Other (See Comments)     No past medical history on file.  No past surgical history on file.  No family history on file.     Review of Systems   Constitutional:  Negative for fever.   HENT:  Negative for sore throat.    Respiratory:  Negative for shortness of breath.    Cardiovascular:  Negative for chest pain.   Gastrointestinal:  Positive for abdominal pain, nausea and vomiting.   Genitourinary:  Negative for dysuria.   Musculoskeletal:  Negative for back pain.   Skin:  Negative for rash.   Neurological:  Negative for weakness.   Hematological:  Does not bruise/bleed easily.       Physical Exam     Initial Vitals [03/25/24 0837]   BP Pulse Resp Temp SpO2   136/75 73 20 97.5 °F (36.4 °C) 99 %      MAP       --         Physical Exam    Constitutional: Vital signs are normal. She appears well-developed and well-nourished.  Non-toxic appearance. No distress.   HENT:   Head: Normocephalic and atraumatic.   Eyes: EOM are normal. Pupils are equal, round, and reactive to light.   Neck: Neck supple. No JVD present.   Normal range of motion.  Cardiovascular:  Normal rate, regular rhythm, normal heart sounds " and intact distal pulses.     Exam reveals no gallop and no friction rub.       No murmur heard.  Pulmonary/Chest: Breath sounds normal. She has no wheezes. She has no rhonchi. She has no rales.   Abdominal: Abdomen is soft. Bowel sounds are normal. There is abdominal tenderness in the right lower quadrant. There is tenderness at McBurney's point. There is no rebound, no guarding and negative Farias's sign. positive Rovsing's sign  Musculoskeletal:         General: Normal range of motion.      Cervical back: Normal range of motion and neck supple. No rigidity.     Neurological: She is alert and oriented to person, place, and time. She has normal strength and normal reflexes. No cranial nerve deficit or sensory deficit. She exhibits normal muscle tone. Coordination normal. GCS eye subscore is 4. GCS verbal subscore is 5. GCS motor subscore is 6.   Skin: Skin is warm and dry.   Psychiatric: She has a normal mood and affect. Her speech is normal and behavior is normal. She is not actively hallucinating.         ED Course   Procedures  Labs Reviewed   CBC W/ AUTO DIFFERENTIAL - Abnormal; Notable for the following components:       Result Value    RBC 5.75 (*)     MCV 72 (*)     MCH 23.0 (*)     MCHC 31.9 (*)     RDW 15.7 (*)     Gran % 79.3 (*)     Lymph % 14.9 (*)     All other components within normal limits   COMPREHENSIVE METABOLIC PANEL - Abnormal; Notable for the following components:    Sodium 135 (*)     CO2 21 (*)     Glucose 134 (*)     All other components within normal limits   URINALYSIS, REFLEX TO URINE CULTURE - Abnormal; Notable for the following components:    Color, UA Colorless (*)     Specific Gravity, UA >1.030 (*)     All other components within normal limits    Narrative:     Specimen Source->Urine   LIPASE   POCT URINE PREGNANCY          Imaging Results              US Pelvis Comp with Transvag NON-OB (xpd (Final result)  Result time 03/25/24 14:53:35      Final result by Blaine Dillon Jr.,  MD (03/25/24 14:53:35)                   Impression:      There is a complex part solid and part fluid collection identified in the endometrial cavity measuring 3.2 cm by 2.5 cm.  In the fluid component there is a an 8 mm polyp.  The echogenic solid material is suspicious for neoplasm.  No evidence of ovarian torsion is noted.      Electronically signed by: Blaine Dillon MD  Date:    03/25/2024  Time:    14:53               Narrative:    EXAMINATION:  US PELVIS COMP WITH TRANSVAG NON-OB (XPD)    CLINICAL HISTORY:  rlq pain. CT neg. r/o torsion;    TECHNIQUE:  Transabdominal sonography of the pelvis was performed, followed by transvaginal sonography to better evaluate the uterus and ovaries.    COMPARISON:  None.    FINDINGS:  Uterus:    Size: 9.2 x 7.4 cm    Masses: In the lumen there is a collection of fluid and solid material which is rather echogenic.  Within the fluid collection is a small polyp measuring 8 mm in size length.  A small echogenic probable calcification is identified in the myometrium.    Endometrium: Elsewhere the endometrial lining is narrow measuring 2.5 mm    Right ovary:    Size: 3.3 x 3.1 cm    Appearance: Normal    Vascular flow: Normal.    Left ovary:    Size: 2.9 x 2 cm    Appearance: Normal    Vascular Flow: Normal.    Free Fluid:    None.                                       CT Abdomen Pelvis With IV Contrast NO Oral Contrast (Final result)  Result time 03/25/24 11:36:35      Final result by Viki Benitez MD (03/25/24 11:36:35)                   Impression:      Uterus appears enlarged with the central endometrial canal thick.  Contraceptive devices as described.  Bilateral ovarian cysts.  Trace free fluid the pelvis not more so than can be seen on a physiologic basis.  Normal appearance of the appendix    Suggest follow-up ultrasound      Electronically signed by: Viki Benitez MD  Date:    03/25/2024  Time:    11:36               Narrative:    EXAMINATION:  CT ABDOMEN PELVIS  WITH IV CONTRAST    CLINICAL HISTORY:  RLQ abdominal pain (Age >= 14y);    TECHNIQUE:  Low dose axial images, sagittal and coronal reformations were obtained from the lung bases to the pubic symphysis following the IV administration of 75 mL of Omnipaque 350 and no p.o. contrast    COMPARISON:  06/26/2023    FINDINGS:  Dependent hypoventilatory change and small bladder cyst in the right posterior lung base similar in appearance to the prior study    Liver and spleen unremarkable appearance.  No calcified stones in the gallbladder or CT findings of acute cholecystitis.  No biliary duct dilatation.  Pancreas and adrenal glands unremarkable appearance.  The abdominal aorta tapers without aneurysmal dilatation    Very tiny fat containing umbilical hernia    Normal appearance of the appendix.  No free intraperitoneal air.  Trace free fluid the pelvis.    Renal enhancement is symmetrical and there is no hydronephrosis.  Small subcentimeter hypodensity in the right kidney suggesting cyst.  Urinary bladder is mildly distended at time of the exam and as visualized is unremarkable appearance    Reproductive organs; enlarged uterus.  Endometrial canal also appears thickened and uterus slightly heterogeneous.  Endometrial canal measured approximately 1.8 cm.  Tubal ligation structures suggesting Essure devices appear to extend into the uterus bilaterally particularly.  This is not significantly changed.  2.9 cm oval right adnexal cyst and 1.6 cm left adnexal cyst suggesting bilateral ovarian cyst    Osseous structures show mild degenerative changes without obvious aggressive appearing osseous lesion                                       Medications   morphine injection 6 mg (6 mg Intravenous Given 3/25/24 0920)   ondansetron injection 4 mg (4 mg Intravenous Given 3/25/24 0921)   iohexoL (OMNIPAQUE 350) 350 mg iodine/mL injection (75 mLs Intravenous Given 3/25/24 0952)     Medical Decision Making  49-year-old woman who presents  emergency department for severe right lower quadrant abdominal pain that began around midnight last night.  She has had associated nausea and vomiting.  No diarrhea, dysuria or fever.  On examination positive Rosving's sign and tenderness over McBurney's point.  Differential diagnosis includes acute appendicitis, ovarian torsion, urinary tract infection, ureterolithiasis.  UPT negative, urinalysis is nitrite and leukocyte negative, no evidence of infection.  CT abdomen pelvis and ultrasound of the pelvis was performed showing a complex fluid collection in the endometrial cavity with a 8 mm polyp and echogenic solid material suspicious for neoplasm.  Normal appendix.  Discussed with Gynecology Dr. Martins who will follow-up the patient closely.  Informed patient of imaging findings and need for close follow-up.  Will provide pain medication and antiemetics.  Discharged improved in no acute distress with OBGYN follow-up.    Amount and/or Complexity of Data Reviewed  Labs: ordered. Decision-making details documented in ED Course.  Radiology: ordered.    Risk  Prescription drug management.               ED Course as of 03/25/24 1800   Mon Mar 25, 2024   1227 Specific Gravity, UA(!): >1.030 [AS]   1227 NITRITE UA: Negative [AS]   1227 Leukocyte Esterase, UA: Negative [AS]   1227 Lipase: 26 [AS]   1227 Sodium(!): 135 [AS]   1227 Potassium: 3.9 [AS]   1227 CO2(!): 21 [AS]   1227 Glucose(!): 134 [AS]   1227 BUN: 9 [AS]   1227 Creatinine: 0.8 [AS]   1227 BILIRUBIN TOTAL: 0.3 [AS]   1227 WBC: 8.97 [AS]   1227 Hemoglobin: 13.2 [AS]   1227 Hematocrit: 41.4 [AS]   1227 Platelet Count: 314 [AS]      ED Course User Index  [AS] José Miguel Collins MD                           Clinical Impression:  Final diagnoses:  [N85.8] Abnormal collection of fluid in uterine cavity (Primary)          ED Disposition Condition    Discharge Stable          ED Prescriptions       Medication Sig Dispense Start Date End Date Auth. Provider     HYDROcodone-acetaminophen (NORCO) 5-325 mg per tablet Take 1 tablet by mouth every 6 (six) hours as needed for Pain. 12 tablet 3/25/2024 -- José Miguel Collins MD    ondansetron (ZOFRAN-ODT) 4 MG TbDL Take 1 tablet (4 mg total) by mouth every 6 (six) hours as needed (nausea). 15 tablet 3/25/2024 -- José Miguel Collins MD          Follow-up Information       Follow up With Specialties Details Why Contact Info Additional Information    Aquiles Martins MD Obstetrics and Gynecology Schedule an appointment as soon as possible for a visit  His nurse will reach out to you with an appointment soon. [address] [facility]  [address]  [facility] [address]  [phone]       [facility] -  Emergency Medicine  As needed, If symptoms worsen [address] [place] [address] [address] [place] [address] 1st floor             José Miguel Collins MD  03/25/24 1800

## 2024-03-25 NOTE — DISCHARGE INSTRUCTIONS
Your ultrasound was abnormal for a complex fluid collection that could be suspicious for possible cancer as well as a polyp in her uterus.  OBGYN is aware and will reach out to you soon for close follow-up and further instructions.

## 2024-03-26 ENCOUNTER — HOSPITAL ENCOUNTER (EMERGENCY)
Facility: HOSPITAL | Age: 49
Discharge: HOME | End: 2024-03-26
Attending: EMERGENCY MEDICINE
Payer: COMMERCIAL

## 2024-03-26 ENCOUNTER — TELEPHONE (OUTPATIENT)
Dept: OBSTETRICS AND GYNECOLOGY | Facility: CLINIC | Age: 49
End: 2024-03-26
Payer: COMMERCIAL

## 2024-03-26 ENCOUNTER — PATIENT MESSAGE (OUTPATIENT)
Dept: INTERNAL MEDICINE | Facility: CLINIC | Age: 49
End: 2024-03-26
Payer: COMMERCIAL

## 2024-03-26 VITALS
DIASTOLIC BLOOD PRESSURE: 68 MMHG | HEART RATE: 87 BPM | BODY MASS INDEX: 25.29 KG/M2 | WEIGHT: 152 LBS | OXYGEN SATURATION: 97 % | SYSTOLIC BLOOD PRESSURE: 118 MMHG | TEMPERATURE: 98 F | RESPIRATION RATE: 16 BRPM

## 2024-03-26 DIAGNOSIS — R10.30 LOWER ABDOMINAL PAIN: Primary | ICD-10-CM

## 2024-03-26 PROCEDURE — 63600175 PHARM REV CODE 636 W HCPCS: Performed by: EMERGENCY MEDICINE

## 2024-03-26 PROCEDURE — 25000003 PHARM REV CODE 250: Performed by: EMERGENCY MEDICINE

## 2024-03-26 PROCEDURE — 99284 EMERGENCY DEPT VISIT MOD MDM: CPT | Mod: 25

## 2024-03-26 PROCEDURE — 96372 THER/PROPH/DIAG INJ SC/IM: CPT | Performed by: EMERGENCY MEDICINE

## 2024-03-26 RX ORDER — HYDROMORPHONE HYDROCHLORIDE 2 MG/1
2 TABLET ORAL EVERY 4 HOURS PRN
Qty: 12 TABLET | Refills: 0 | Status: SHIPPED | OUTPATIENT
Start: 2024-03-26

## 2024-03-26 RX ORDER — DICLOFENAC SODIUM 75 MG/1
75 TABLET, DELAYED RELEASE ORAL 2 TIMES DAILY
Qty: 60 TABLET | Refills: 0 | Status: ON HOLD | OUTPATIENT
Start: 2024-03-26 | End: 2024-04-01 | Stop reason: HOSPADM

## 2024-03-26 RX ORDER — HYDROMORPHONE HYDROCHLORIDE 2 MG/1
2 TABLET ORAL
Status: COMPLETED | OUTPATIENT
Start: 2024-03-26 | End: 2024-03-26

## 2024-03-26 RX ORDER — KETOROLAC TROMETHAMINE 30 MG/ML
15 INJECTION, SOLUTION INTRAMUSCULAR; INTRAVENOUS
Status: COMPLETED | OUTPATIENT
Start: 2024-03-26 | End: 2024-03-26

## 2024-03-26 RX ADMIN — KETOROLAC TROMETHAMINE 15 MG: 30 INJECTION, SOLUTION INTRAMUSCULAR at 07:03

## 2024-03-26 RX ADMIN — HYDROMORPHONE HYDROCHLORIDE 2 MG: 2 TABLET ORAL at 07:03

## 2024-03-26 NOTE — FIRST PROVIDER EVALUATION
" Emergency Department TeleTriage Encounter Note      CHIEF COMPLAINT    Chief Complaint   Patient presents with    Abdominal Pain     Pt was seen yesterday for same complaint, states that her pain has gotten worse and the pain medications that were prescribed are not helping.  Pt also had an episode of blood in her urine.          VITAL SIGNS   Initial Vitals [03/26/24 1801]   BP Pulse Resp Temp SpO2   115/72 69 16 97.9 °F (36.6 °C) 97 %      MAP       --            ALLERGIES    Review of patient's allergies indicates:   Allergen Reactions    Prochlorperazine Other (See Comments)     Other reaction(s): Muscle Pain, Unknown, Unknown  Pt reports drug as "Making my neck twitch".  neck twitching  "Makes her neck twitch"  Pt reports drug as "Making my neck twitch".  Pt reports drug as "Making my neck twitch".  "Makes her neck twitch"  Pt reports drug as "Making my neck twitch".      Oxycodone-acetaminophen Nausea And Vomiting and Other (See Comments)       PROVIDER TRIAGE NOTE  Patient presents with complaint of continued lower abdominal pain.  Seen in the ED yesterday for the same.  Had CT and ultrasound obtained.  Is seeing OB gyn tomorrow.      Phy:   Constitutional: well nourished, well developed, appearing stated age, NAD        Initial orders will be placed and care will be transferred to an alternate provider when patient is roomed for a full evaluation. Any additional orders and the final disposition will be determined by that provider.        ORDERS  Labs Reviewed - No data to display    ED Orders (720h ago, onward)      None              Virtual Visit Note: The provider triage portion of this emergency department evaluation and documentation was performed via [employer], a HIPAA-compliant telemedicine application, in concert with a tele-presenter in the room. A face to face patient evaluation with one of my colleagues will occur once the patient is placed in an emergency department room.      DISCLAIMER: This " note was prepared with [employer] your brands voice recognition transcription software. Garbled syntax, mangled pronouns, and other bizarre constructions may be attributed to that software system.

## 2024-03-26 NOTE — TELEPHONE ENCOUNTER
----- Message from Saarh Crowe sent at 3/26/2024 10:44 AM CDT -----  Type:  Sooner Appointment Request    Caller is requesting a sooner appointment.  Caller declined first available appointment listed below.  Caller will not accept being placed on the waitlist and is requesting a message be sent to doctor.    Name of Caller:pt    When is the first available appointment?4/24    Symptoms:mass on uterus seen by the ER    Would the patient rather a call back or a response via [employer]? Call back    Best Call Back Number:[phone]      Additional Information: ER told the pt to be seen as soon as possible     Please call Back to advise. Thanks!

## 2024-03-27 ENCOUNTER — OFFICE VISIT (OUTPATIENT)
Dept: OBSTETRICS AND GYNECOLOGY | Facility: CLINIC | Age: 49
End: 2024-03-27
Payer: COMMERCIAL

## 2024-03-27 VITALS
DIASTOLIC BLOOD PRESSURE: 72 MMHG | WEIGHT: 152.56 LBS | HEIGHT: 65 IN | SYSTOLIC BLOOD PRESSURE: 114 MMHG | BODY MASS INDEX: 25.42 KG/M2

## 2024-03-27 DIAGNOSIS — R10.2 PELVIC PAIN: Primary | ICD-10-CM

## 2024-03-27 DIAGNOSIS — R93.5 ABNORMAL ULTRASOUND OF ENDOMETRIUM: ICD-10-CM

## 2024-03-27 DIAGNOSIS — N93.9 ABNORMAL UTERINE BLEEDING (AUB): ICD-10-CM

## 2024-03-27 PROCEDURE — 3008F BODY MASS INDEX DOCD: CPT | Mod: CPTII,S$GLB,, | Performed by: OBSTETRICS & GYNECOLOGY

## 2024-03-27 PROCEDURE — 1159F MED LIST DOCD IN RCRD: CPT | Mod: CPTII,S$GLB,, | Performed by: OBSTETRICS & GYNECOLOGY

## 2024-03-27 PROCEDURE — 99999 PR PBB SHADOW E&M-EST. PATIENT-LVL IV: CPT | Mod: PBBFAC,,, | Performed by: OBSTETRICS & GYNECOLOGY

## 2024-03-27 PROCEDURE — 99204 OFFICE O/P NEW MOD 45 MIN: CPT | Mod: S$GLB,,, | Performed by: OBSTETRICS & GYNECOLOGY

## 2024-03-27 PROCEDURE — 3078F DIAST BP <80 MM HG: CPT | Mod: CPTII,S$GLB,, | Performed by: OBSTETRICS & GYNECOLOGY

## 2024-03-27 PROCEDURE — 3074F SYST BP LT 130 MM HG: CPT | Mod: CPTII,S$GLB,, | Performed by: OBSTETRICS & GYNECOLOGY

## 2024-03-27 RX ORDER — MUPIROCIN 20 MG/G
OINTMENT TOPICAL
Status: CANCELLED | OUTPATIENT
Start: 2024-03-27

## 2024-03-27 RX ORDER — SODIUM CHLORIDE 9 MG/ML
INJECTION, SOLUTION INTRAVENOUS CONTINUOUS
Status: CANCELLED | OUTPATIENT
Start: 2024-03-27

## 2024-03-27 RX ORDER — CEFAZOLIN SODIUM 2 G/50ML
2 SOLUTION INTRAVENOUS
Status: CANCELLED | OUTPATIENT
Start: 2024-03-27

## 2024-03-27 NOTE — ED PROVIDER NOTES
"Encounter Date: 3/26/2024       History     Chief Complaint   Patient presents with    Abdominal Pain     Pt was seen yesterday for same complaint, states that her pain has gotten worse and the pain medications that were prescribed are not helping.  Pt also had an episode of blood in her urine.        HPI  49 y.o.   Co lower abd pain since Sunday   Was seen here yesterday, concern for pelvic mass   Sent home on medicines, states non controlling pain   One episode of emesis   Had some VB yesterday, not currently   Has fu with OB GYN tomorrow    Review of patient's allergies indicates:   Allergen Reactions    Prochlorperazine Other (See Comments)     Other reaction(s): Muscle Pain, Unknown, Unknown  Pt reports drug as "Making my neck twitch".  neck twitching  "Makes her neck twitch"  Pt reports drug as "Making my neck twitch".  Pt reports drug as "Making my neck twitch".  "Makes her neck twitch"  Pt reports drug as "Making my neck twitch".      Oxycodone-acetaminophen Nausea And Vomiting and Other (See Comments)     No past medical history on file.  No past surgical history on file.  No family history on file.     Review of Systems  All systems were reviewed/examined and were negative except as noted in the HPI.    Physical Exam     Initial Vitals [03/26/24 1801]   BP Pulse Resp Temp SpO2   115/72 69 16 97.9 °F (36.6 °C) 97 %      MAP       --         Physical Exam    General: the patient is awake, alert, and in no apparent distress.  Head: normocephalic and atraumatic, sclera are clear  Neck: supple without meningismus  Chest:no respiratory distress  Heart: regular rate and rhythm  ABD soft, mild RLQ t, nondistended, no peritoneal signs  Extremities: warm and well perfused  Skin: warm and dry  Psych conversant  Neuro: awake, alert, moving all extremities    ED Course   Procedures  Labs Reviewed - No data to display       Imaging Results    None          Medications   ketorolac injection 15 mg (15 mg Intramuscular " Given 3/26/24 1927)   HYDROmorphone tablet 2 mg (2 mg Oral Given 3/26/24 1927)     Medical Decision Making  Risk  Prescription drug management.      Medical Decision Making:    This is an emergent evaluation of a patient presenting to the ED.  Nursing notes were reviewed.    I decided to obtain and review old medical records, which showed: ED visit yesterday, CT scan, US    Analgesics given  Sig improvement in pain  Pt would like to try to go home and fu tomorrow  Rx    Evaluation for Emergency Medical Condition  The patient received a medical screening exam and within a reasonable degree of clinical confidence an emergency medical condition has not been identified.  The patient is instructed on proper follow up and return precautions to the ED.      Ricardo Farrell MD, SUSANA                                    Clinical Impression:  Final diagnoses:  [R10.30] Lower abdominal pain (Primary)          ED Disposition Condition    Discharge Stable          ED Prescriptions       Medication Sig Dispense Start Date End Date Auth. Provider    HYDROmorphone (DILAUDID) 2 MG tablet Take 1 tablet (2 mg total) by mouth every 4 (four) hours as needed for Pain. 12 tablet 3/26/2024 -- Luis Felipe Farrell MD    diclofenac (VOLTAREN) 75 MG EC tablet Take 1 tablet (75 mg total) by mouth 2 (two) times daily. 60 tablet 3/26/2024 -- Luis Felipe Farrell MD          Follow-up Information       Follow up With Specialties Details Why Contact Info    Aquiles Martins MD Obstetrics and Gynecology Schedule an appointment as soon as possible for a visit   [address] [facility]  [address] [place] [id number]  [phone]            Discharged to home in stable condition, return to ED warnings given, follow up and patient care instructions given.      Ricardo Farrell MD, SUSANA, [facility]  Department of Emergency Medicine       Luis Felipe Farrell MD  03/27/24 2065

## 2024-03-27 NOTE — PROGRESS NOTES
"  Subjective:   Chief Complaint:  Pelvic Pain       Patient ID: Elena Ingram is a  49 y.o. female.    Contraception: Essure    Date: 3/27/2024    The patient presents today due to the following:  Beginning on the evening of 2024 the patient reports initial right lower quadrant pain which has now radiated to a central location.    She was evaluated in the emergency room since that time on two occasions.  Prior to each occasion she had a single episode of vomiting  Beginning on 2024 she noted the onset of heavy irregular bleeding.    ED evaluation was compatible for an endometrial cavity lesion and the patient presents today to discuss further evaluation.    No previous significant gyn issues.  She reports a normal Pap smear in 2023 with no history of abnormal Pap smears.      GYN & OB History  Patient's last menstrual period was 2024 (approximate).     Date of Last Pap: No result found  OB History          5    Para   4    Term   4            AB   1    Living   3         SAB   1    IAB        Ectopic        Multiple        Live Births               Obstetric Comments   Vaginal Delivery x 4 (SIDS x 1)  SAB x1               Allergies:   Review of patient's allergies indicates:   Allergen Reactions    Prochlorperazine Other (See Comments)     Other reaction(s): Muscle Pain, Unknown, Unknown  Pt reports drug as "Making my neck twitch".  neck twitching  "Makes her neck twitch"  Pt reports drug as "Making my neck twitch".  Pt reports drug as "Making my neck twitch".  "Makes her neck twitch"  Pt reports drug as "Making my neck twitch".      Oxycodone-acetaminophen Nausea And Vomiting and Other (See Comments)       History reviewed. No pertinent past medical history.    Past Surgical History:   Procedure Laterality Date    Essure Bilateral 2015       Medications    Current Outpatient Medications:     HYDROmorphone (DILAUDID) 2 MG tablet, Take 1 tablet (2 mg total) by " mouth every 4 (four) hours as needed for Pain., Disp: 12 tablet, Rfl: 0    multivitamin with minerals tablet, Body, Hair, Skin and Nails, Disp: , Rfl:     valACYclovir (VALTREX) 1000 MG tablet, Take 2 tablets (2,000 mg total) by mouth every 12 (twelve) hours., Disp: 4 tablet, Rfl: 0    azelastine (ASTELIN) 137 mcg (0.1 %) nasal spray, 1 spray (137 mcg total) by Nasal route 2 (two) times daily. (Patient not taking: Reported on 3/27/2024), Disp: 30 mL, Rfl: 0    diclofenac (VOLTAREN) 75 MG EC tablet, Take 1 tablet (75 mg total) by mouth 2 (two) times daily. (Patient not taking: Reported on 3/27/2024), Disp: 60 tablet, Rfl: 0    fluticasone propionate (FLONASE) 50 mcg/actuation nasal spray, 1 spray (50 mcg total) by Each Nostril route once daily. (Patient not taking: Reported on 3/27/2024), Disp: 15.8 mL, Rfl: 0    HYDROcodone-acetaminophen (NORCO) 5-325 mg per tablet, Take 1 tablet by mouth every 6 (six) hours as needed for Pain. (Patient not taking: Reported on 3/27/2024), Disp: 12 tablet, Rfl: 0    hyoscyamine (ANASPAZ,LEVSIN) 0.125 mg Tab, Take 3 tablets (375 mcg total) by mouth every 6 (six) hours as needed., Disp: 20 tablet, Rfl: 1    ondansetron (ZOFRAN) 8 MG tablet, Take 1 tablet (8 mg total) by mouth every 12 (twelve) hours as needed for Nausea. (Patient not taking: Reported on 12/24/2023), Disp: 8 tablet, Rfl: 1    ondansetron (ZOFRAN-ODT) 4 MG TbDL, Take 1 tablet (4 mg total) by mouth every 6 (six) hours as needed (nausea). (Patient not taking: Reported on 3/27/2024), Disp: 15 tablet, Rfl: 0  No current facility-administered medications for this visit.     Social History     Tobacco Use    Smoking status: Never    Smokeless tobacco: Never       History reviewed. No pertinent family history.    Review of Systems (at today's evaluation)  Review of Systems   Constitutional:  Negative for fever and unexpected weight change.   Respiratory: Negative.     Cardiovascular:  Negative for chest pain and palpitations.    Gastrointestinal:  Positive for abdominal pain, nausea and vomiting.   Genitourinary:  Negative for dysuria, hematuria and urgency.        Gyn as per HPI   Neurological:  Negative for headaches.        Objective:      Vitals:    03/27/24 1306   BP: 114/72     Physical Exam:   Constitutional: She appears well-developed and well-nourished.    HENT:   Head: Normocephalic.     Neck: No thyroid mass present.    Cardiovascular:  Normal rate.             Pulmonary/Chest: Effort normal. No respiratory distress.        Abdominal: Soft. There is no abdominal tenderness.     Genitourinary:    Inguinal canal, vagina, right adnexa and left adnexa normal.      Pelvic exam was performed with patient supine.   The external female genitalia was normal.   No external genitalia lesions identified,Cervix is normal. Right adnexum displays no mass and no tenderness. Left adnexum displays no mass and no tenderness. No tenderness or bleeding in the vagina. Uterus is tender. Uterus is not enlarged. Normal urethral meatus.Urethra findings: no tendernessBladder findings: no bladder tenderness   Genitourinary Comments: A chaperone (female medical assistant) was present throughout the pelvic exam.             Musculoskeletal: Normal range of motion.      Right lower leg: No edema.      Left lower leg: No edema.      Lymphadenopathy:     She has no cervical adenopathy. No inguinal adenopathy noted on the right or left side.    Neurological: She is alert.    Skin: Skin is warm and dry.    Psychiatric: Mood normal.         Recent Laboratory Results:    Results    Collected Updated Procedure    03/25/2024 1250 03/25/2024 1301 POCT urine pregnancy [[id number]] Component Value   POC Preg Test, Ur Negative    Acceptable Yes          03/25/2024 1104 03/25/2024 1144 Urinalysis, Reflex to Urine Culture Urine, Clean Catch [[id number]]    (Abnormal)   Urine    Component Value Units   Specimen UA Urine, Clean Catch    Color, UA Colorless  Abnormal     Appearance, UA Clear    pH, UA 8.0    Specific Gravity, UA >1.030 Abnormal     Protein, UA Negative     Glucose, UA Negative    Ketones, UA Negative    Bilirubin (UA) Negative    Occult Blood UA Negative    Nitrite, UA Negative    Urobilinogen, UA Negative EU/dL   Leukocytes, UA Negative           03/25/2024 0900 03/25/2024 0944 Lipase [[id number]]   Blood    Component Value Units   Lipase 26 U/L          03/25/2024 0900 03/25/2024 0944 Comp. Metabolic Panel [[id number]]   (Abnormal)   Blood    Component Value Units   Sodium 135 Low  mmol/L   Potassium 3.9 mmol/L   Chloride 103 mmol/L   CO2 21 Low  mmol/L   Glucose 134 High  mg/dL   BUN 9 mg/dL   Creatinine 0.8 mg/dL   Calcium 9.7 mg/dL   Total Protein 7.8 g/dL   Albumin 3.9 g/dL   Total Bilirubin 0.3  mg/dL   Alkaline Phosphatase 59 U/L   AST 12 U/L   ALT 15 U/L   eGFR >60 mL/min/1.73 m^2   Anion Gap 11 mmol/L          03/25/2024 0900 03/25/2024 0916 CBC W/ AUTO DIFFERENTIAL [[id number]]   (Abnormal)   Blood    Component Value Units   WBC 8.97 K/uL   RBC 5.75 High  M/uL   Hemoglobin 13.2 g/dL   Hematocrit 41.4 %   MCV 72 Low  fL   MCH 23.0 Low  pg   MCHC 31.9 Low  g/dL   RDW 15.7 High  %   Platelets 314 K/uL   MPV 9.4 fL   Immature Granulocytes 0.1 %   Gran # (ANC) 7.1 K/uL   Immature Grans (Abs) 0.01  K/uL   Lymph # 1.3 K/uL   Mono # 0.5 K/uL   Eos # 0.0 K/uL   Baso # 0.03 K/uL   nRBC 0 /100 WBC   Gran % 79.3 High  %   Lymph % 14.9 Low  %   Mono % 5.4 %   Eosinophil % 0.0 %   Basophil % 0.3 %   Differential Method Automated                 Recent Radiology Results:    3/25/2024 STAT     Narrative & Impression  EXAMINATION:  CT ABDOMEN PELVIS WITH IV CONTRAST     CLINICAL HISTORY:  RLQ abdominal pain (Age >= 14y);    FINDINGS:  Dependent hypoventilatory change and small bladder cyst in the right posterior lung base similar in appearance to the prior study     Liver and spleen unremarkable appearance.  No calcified stones in the gallbladder or CT findings  of acute cholecystitis.  No biliary duct dilatation.  Pancreas and adrenal glands unremarkable appearance.  The abdominal aorta tapers without aneurysmal dilatation     Very tiny fat containing umbilical hernia     Normal appearance of the appendix.  No free intraperitoneal air.  Trace free fluid the pelvis.     Renal enhancement is symmetrical and there is no hydronephrosis.  Small subcentimeter hypodensity in the right kidney suggesting cyst.  Urinary bladder is mildly distended at time of the exam and as visualized is unremarkable appearance     Reproductive organs; enlarged uterus.  Endometrial canal also appears thickened and uterus slightly heterogeneous.  Endometrial canal measured approximately 1.8 cm.  Tubal ligation structures suggesting Essure devices appear to extend into the uterus bilaterally particularly.  This is not significantly changed.  2.9 cm oval right adnexal cyst and 1.6 cm left adnexal cyst suggesting bilateral ovarian cyst     Osseous structures show mild degenerative changes without obvious aggressive appearing osseous lesion     Impression:     Uterus appears enlarged with the central endometrial canal thick.  Contraceptive devices as described.  Bilateral ovarian cysts.  Trace free fluid the pelvis not more so than can be seen on a physiologic basis.  Normal appearance of the appendix      3/25/2024 STAT     Narrative & Impression  EXAMINATION:  US PELVIS COMP WITH TRANSVAG NON-OB (XPD)     CLINICAL HISTORY:  rlq pain. CT neg. r/o torsion;    FINDINGS:  Uterus:     Size: 9.2 x 7.4 cm     Masses: In the lumen there is a collection of fluid and solid material which is rather echogenic.  Within the fluid collection is a small polyp measuring 8 mm in size length.  A small echogenic probable calcification is identified in the myometrium.     Endometrium: Elsewhere the endometrial lining is narrow measuring 2.5 mm     Right ovary:     Size: 3.3 x 3.1 cm     Appearance: Normal     Vascular  flow: Normal.     Left ovary:     Size: 2.9 x 2 cm     Appearance: Normal     Vascular Flow: Normal.     Free Fluid:     None.     Impression:     There is a complex part solid and part fluid collection identified in the endometrial cavity measuring 3.2 cm by 2.5 cm.  In the fluid component there is a an 8 mm polyp.  The echogenic solid material is suspicious for neoplasm.  No evidence of ovarian torsion is noted.    Assessment:        1. Pelvic pain    2. Abnormal uterine bleeding (AUB)    3. Abnormal ultrasound of endometrium        Plan:      Pelvic pain  -     Place in Outpatient; Standing  -     Full code; Standing  -     Vital signs; Standing  -     Bed rest with bathroom privileges; Standing  -     Insert peripheral IV; Standing  -     Chlorhexidine (CHG) 2% Wipes; Standing  -     Notify Physician/Vital Signs Parameters Urine output less than 0.5mL/kg/hr (with indwelling catheter) or 30 mL/hr (without indwelling catheter) or blood glucose greater than 200 mg/dL; Standing  -     Notify physician; Standing  -     Notify Physician - Potential Need of Opioid Reversal; Standing  -     Diet NPO; Standing  -     Chlorohexidine Gluconate Bath; Standing  -     Case Request Operating Room: HYSTEROSCOPY, WITH DILATION AND CURETTAGE OF UTERUS and other indicated procedures  -     Place sequential compression device; Standing    Abnormal uterine bleeding (AUB)    Abnormal ultrasound of endometrium    Other orders  -     0.9%  NaCl infusion  -     IP VTE LOW RISK PATIENT; Standing  -     cefazolin (ANCEF) 2 gram in dextrose 5% 50 mL IVPB (premix)  -     mupirocin 2 % ointment       Follow up for As needed or 2 weeks following surgery.     The above was reviewed and discussed with the patient.    We discussed her issues with pelvic pain abnormal bleeding and findings on both CT scan and pelvic ultrasound..    Conservative, medical, and surgical interventions were discussed, and the patient would like to proceed with  surgical intervention.  She will be scheduled for a HYSTEROSCOPY, DILATION AND CURETTAGE AND OTHER INDICATED PROCEDURES    The pros, cons, risks, benefits, alternatives, and indications of the surgical procedure were discussed in detail with the patient.  We discussed the possibility of allergic reactions, bleeding, infection damage to surrounding structures (cervix, uterus, bladder, ureters, bowel) and other abdominal pelvic organs.  Issues unique to this procedure were discussed.    The patient's questions regarding above were answered and she agrees with this plan.  The patient was provided with the informed consent form and given times reviewed the form.  Questions were answered and consent was obtained    The patient's questions were answered, and she is in agreement with the current plan.     Aquiles Martins MD  Department OBGYN [facility]

## 2024-03-27 NOTE — ED NOTES
Pt aaox4 from home c/o lower abd pain more painful to the rlq. Pt states that she was seen here for same on yesterday and prescribed prednisone, but it is not working. Pt states that she had an abnormal vag US on yesterday and has an appt with her ob/gyn on tomorrow. Pt states that the pain has gotten so bad that she could not wait for the appt. Pt also reports an episode of vag bleeding last night and and episode of vomiting on today. Family present at bedside.

## 2024-03-28 ENCOUNTER — HOSPITAL ENCOUNTER (OUTPATIENT)
Dept: PREADMISSION TESTING | Facility: HOSPITAL | Age: 49
Discharge: HOME | End: 2024-03-28
Attending: OBSTETRICS & GYNECOLOGY
Payer: COMMERCIAL

## 2024-03-28 ENCOUNTER — ANESTHESIA EVENT (OUTPATIENT)
Dept: SURGERY | Facility: HOSPITAL | Age: 49
End: 2024-03-28
Payer: COMMERCIAL

## 2024-03-28 DIAGNOSIS — Z01.818 PREOP TESTING: Primary | ICD-10-CM

## 2024-03-28 DIAGNOSIS — Z01.818 PREOP TESTING: ICD-10-CM

## 2024-03-28 PROCEDURE — 93010 ELECTROCARDIOGRAM REPORT: CPT | Mod: ,,, | Performed by: GENERAL PRACTICE

## 2024-03-28 PROCEDURE — 93005 ELECTROCARDIOGRAM TRACING: CPT

## 2024-03-28 NOTE — DISCHARGE INSTRUCTIONS
To confirm, Your doctor has instructed you that surgery is scheduled for:  4/1/24 with Dr. Martins    Please report to [facility], Registration the morning of surgery. You must check-in and receive a wristband before going to your procedure.  [address] [place] [address] [place] [address]    Pre-Op will call the THURSDAY prior to surgery between 1:00 and 6:00 PM with the final arrival time.  Phone number: [phone]    PLEASE NOTE:  The surgery schedule has many variables which may affect the time of your surgery case.  Family members should be available if your surgery time changes.  Plan to be here the day of your procedure between 4-6 hours.    MEDICATIONS:  TAKE ONLY THESE MEDICATIONS WITH A SMALL SIP OF WATER THE MORNING OF YOUR PROCEDURE:  SEE LIST      DO NOT TAKE THESE MEDICATIONS 5-7 DAYS PRIOR to your procedure or per your surgeon's request:   ASPIRIN, ALEVE, ADVIL, IBUPROFEN, FISH OIL VITAMIN E, HERBALS  (May take Tylenol)    ONLY if you are prescribed any types of blood thinners such as:  Aspirin, Coumadin, Plavix, Pradaxa, Xarelto, Aggrenox, Effient, Eliquis, Savasya, Brilinta, or any other, ask your surgeon whether you should stop taking them and how long before surgery you should stop.  You may also need to verify with the prescribing physician if it is ok to stop your medication.      INSTRUCTIONS IMPORTANT!!  Do not eat or drink anything between midnight and the time of your procedure- this includes gum, mints, and candy.  Do not smoke or drink alcoholic beverages 24 hours prior to your procedure.  Shower the night before AND the morning of your procedure with a Chlorhexidine wash such as Hibiclens or Dial antibacterial soap from the neck down.  Do not get it on your face or in your eyes.  You may use your own shampoo and face wash. This helps your skin to be as bacteria free as possible.    If you wear contact lenses, dentures, hearing aids or glasses, bring a container to put them  in during surgery and give to a family member for safe keeping.  Please leave all jewelry, piercing's and valuables at home. You must remove your false eyelashes prior to surgery.    DO NOT remove hair from the surgery site.  Do not shave the incision site unless you are given specific instructions to do so.    ONLY if you have been diagnosed with sleep apnea please bring your C-PAP machine.  ONLY if you wear home oxygen please bring your portable oxygen tank the day of your procedure.  ONLY if you have a history of OPEN HEART SURGERY you will need a clearance from your Cardiologist per Anesthesia.      ONLY for patients requiring bowel prep, written instructions will be given by your doctor's office.  ONLY if you have a neuro stimulator, please bring the controller with you the morning of surgery  ONLY if a type and screen test is needed before surgery, please return:  If your doctor has scheduled you for an overnight stay, bring a small overnight bag with any personal items you need.  Make arrangements in advance for transportation home by a responsible adult. You can not go home in an uber or a cab per hospital policy.  It is not safe to drive a vehicle during the 24 hours after anesthesia.          All  facilities and properties are tobacco free.  Smoking is NOT allowed.   If you have any questions about these instructions, call Pre-Op Admit  Nursing at [phone] or the Pre-Op Day Surgery Unit at [phone].

## 2024-04-01 ENCOUNTER — ANESTHESIA (OUTPATIENT)
Dept: SURGERY | Facility: HOSPITAL | Age: 49
End: 2024-04-01
Payer: COMMERCIAL

## 2024-04-01 ENCOUNTER — HOSPITAL ENCOUNTER (OUTPATIENT)
Facility: HOSPITAL | Age: 49
Discharge: HOME | End: 2024-04-01
Attending: OBSTETRICS & GYNECOLOGY | Admitting: OBSTETRICS & GYNECOLOGY
Payer: COMMERCIAL

## 2024-04-01 DIAGNOSIS — R10.2 PELVIC PAIN: ICD-10-CM

## 2024-04-01 DIAGNOSIS — Z01.818 PREOP TESTING: ICD-10-CM

## 2024-04-01 DIAGNOSIS — N93.9 ABNORMAL UTERINE BLEEDING (AUB): Primary | ICD-10-CM

## 2024-04-01 PROBLEM — R93.89 ABNORMAL PELVIC ULTRASOUND: Status: ACTIVE | Noted: 2024-04-01

## 2024-04-01 LAB
B-HCG UR QL: NEGATIVE
CTP QC/QA: YES
OHS QRS DURATION: 80 MS
OHS QTC CALCULATION: 460 MS

## 2024-04-01 PROCEDURE — 36000706: Performed by: OBSTETRICS & GYNECOLOGY

## 2024-04-01 PROCEDURE — 36000707: Performed by: OBSTETRICS & GYNECOLOGY

## 2024-04-01 PROCEDURE — 37000008 HC ANESTHESIA 1ST 15 MINUTES: Performed by: OBSTETRICS & GYNECOLOGY

## 2024-04-01 PROCEDURE — 63600175 PHARM REV CODE 636 W HCPCS: Performed by: NURSE ANESTHETIST, CERTIFIED REGISTERED

## 2024-04-01 PROCEDURE — 94799 UNLISTED PULMONARY SVC/PX: CPT

## 2024-04-01 PROCEDURE — 37000009 HC ANESTHESIA EA ADD 15 MINS: Performed by: OBSTETRICS & GYNECOLOGY

## 2024-04-01 PROCEDURE — 71000033 HC RECOVERY, INTIAL HOUR: Performed by: OBSTETRICS & GYNECOLOGY

## 2024-04-01 PROCEDURE — 71000039 HC RECOVERY, EACH ADD'L HOUR: Performed by: OBSTETRICS & GYNECOLOGY

## 2024-04-01 PROCEDURE — 27201423 OPTIME MED/SURG SUP & DEVICES STERILE SUPPLY: Performed by: OBSTETRICS & GYNECOLOGY

## 2024-04-01 PROCEDURE — 81025 URINE PREGNANCY TEST: CPT | Performed by: ANESTHESIOLOGY

## 2024-04-01 PROCEDURE — 25000003 PHARM REV CODE 250: Performed by: OBSTETRICS & GYNECOLOGY

## 2024-04-01 PROCEDURE — 71000015 HC POSTOP RECOV 1ST HR: Performed by: OBSTETRICS & GYNECOLOGY

## 2024-04-01 PROCEDURE — 58558 HYSTEROSCOPY BIOPSY: CPT | Mod: ,,, | Performed by: OBSTETRICS & GYNECOLOGY

## 2024-04-01 PROCEDURE — 25000003 PHARM REV CODE 250: Performed by: NURSE ANESTHETIST, CERTIFIED REGISTERED

## 2024-04-01 PROCEDURE — 25000003 PHARM REV CODE 250: Performed by: ANESTHESIOLOGY

## 2024-04-01 PROCEDURE — C1782 MORCELLATOR: HCPCS | Performed by: OBSTETRICS & GYNECOLOGY

## 2024-04-01 PROCEDURE — 63600175 PHARM REV CODE 636 W HCPCS: Performed by: OBSTETRICS & GYNECOLOGY

## 2024-04-01 RX ORDER — FENTANYL CITRATE 50 UG/ML
INJECTION, SOLUTION INTRAMUSCULAR; INTRAVENOUS
Status: DISCONTINUED | OUTPATIENT
Start: 2024-04-01 | End: 2024-04-01

## 2024-04-01 RX ORDER — LIDOCAINE HYDROCHLORIDE 10 MG/ML
0.5 INJECTION, SOLUTION EPIDURAL; INFILTRATION; INTRACAUDAL; PERINEURAL ONCE
Status: ACTIVE | OUTPATIENT
Start: 2024-04-01

## 2024-04-01 RX ORDER — DEXAMETHASONE SODIUM PHOSPHATE 4 MG/ML
INJECTION, SOLUTION INTRA-ARTICULAR; INTRALESIONAL; INTRAMUSCULAR; INTRAVENOUS; SOFT TISSUE
Status: DISCONTINUED | OUTPATIENT
Start: 2024-04-01 | End: 2024-04-01

## 2024-04-01 RX ORDER — FENTANYL CITRATE 50 UG/ML
25 INJECTION, SOLUTION INTRAMUSCULAR; INTRAVENOUS EVERY 5 MIN PRN
Status: ACTIVE | OUTPATIENT
Start: 2024-04-01

## 2024-04-01 RX ORDER — SILVER NITRATE 38.21; 12.74 MG/1; MG/1
1 STICK TOPICAL ONCE
Status: DISCONTINUED | OUTPATIENT
Start: 2024-04-01 | End: 2024-04-01 | Stop reason: HOSPADM

## 2024-04-01 RX ORDER — ONDANSETRON HYDROCHLORIDE 2 MG/ML
INJECTION, SOLUTION INTRAVENOUS
Status: DISCONTINUED | OUTPATIENT
Start: 2024-04-01 | End: 2024-04-01

## 2024-04-01 RX ORDER — HYDROMORPHONE HYDROCHLORIDE 2 MG/ML
0.2 INJECTION, SOLUTION INTRAMUSCULAR; INTRAVENOUS; SUBCUTANEOUS EVERY 5 MIN PRN
Status: ACTIVE | OUTPATIENT
Start: 2024-04-01

## 2024-04-01 RX ORDER — DIPHENHYDRAMINE HYDROCHLORIDE 50 MG/ML
25 INJECTION INTRAMUSCULAR; INTRAVENOUS EVERY 6 HOURS PRN
Status: ACTIVE | OUTPATIENT
Start: 2024-04-01

## 2024-04-01 RX ORDER — MIDAZOLAM HYDROCHLORIDE 1 MG/ML
INJECTION, SOLUTION INTRAMUSCULAR; INTRAVENOUS
Status: DISCONTINUED | OUTPATIENT
Start: 2024-04-01 | End: 2024-04-01

## 2024-04-01 RX ORDER — ACETAMINOPHEN 10 MG/ML
INJECTION, SOLUTION INTRAVENOUS
Status: DISCONTINUED | OUTPATIENT
Start: 2024-04-01 | End: 2024-04-01

## 2024-04-01 RX ORDER — LIDOCAINE HYDROCHLORIDE 20 MG/ML
INJECTION INTRAVENOUS
Status: DISCONTINUED | OUTPATIENT
Start: 2024-04-01 | End: 2024-04-01

## 2024-04-01 RX ORDER — MUPIROCIN 20 MG/G
OINTMENT TOPICAL
Status: DISCONTINUED | OUTPATIENT
Start: 2024-04-01 | End: 2024-04-01 | Stop reason: HOSPADM

## 2024-04-01 RX ORDER — SODIUM CHLORIDE 9 MG/ML
INJECTION, SOLUTION INTRAVENOUS CONTINUOUS
Status: DISCONTINUED | OUTPATIENT
Start: 2024-04-01 | End: 2024-04-01 | Stop reason: HOSPADM

## 2024-04-01 RX ORDER — IBUPROFEN 600 MG/1
600 TABLET ORAL EVERY 6 HOURS PRN
Qty: 40 TABLET | Refills: 0 | Status: SHIPPED | OUTPATIENT
Start: 2024-04-01

## 2024-04-01 RX ORDER — SODIUM CHLORIDE, SODIUM LACTATE, POTASSIUM CHLORIDE, CALCIUM CHLORIDE 600; 310; 30; 20 MG/100ML; MG/100ML; MG/100ML; MG/100ML
INJECTION, SOLUTION INTRAVENOUS CONTINUOUS
Status: ACTIVE | OUTPATIENT
Start: 2024-04-01

## 2024-04-01 RX ORDER — KETOROLAC TROMETHAMINE 30 MG/ML
INJECTION, SOLUTION INTRAMUSCULAR; INTRAVENOUS
Status: DISCONTINUED | OUTPATIENT
Start: 2024-04-01 | End: 2024-04-01

## 2024-04-01 RX ORDER — PROPOFOL 10 MG/ML
VIAL (ML) INTRAVENOUS
Status: DISCONTINUED | OUTPATIENT
Start: 2024-04-01 | End: 2024-04-01

## 2024-04-01 RX ORDER — MEPERIDINE HYDROCHLORIDE 50 MG/ML
12.5 INJECTION INTRAMUSCULAR; INTRAVENOUS; SUBCUTANEOUS ONCE
Status: ACTIVE | OUTPATIENT
Start: 2024-04-01 | End: 2024-04-02

## 2024-04-01 RX ORDER — ONDANSETRON HYDROCHLORIDE 2 MG/ML
4 INJECTION, SOLUTION INTRAVENOUS ONCE
Status: ACTIVE | OUTPATIENT
Start: 2024-04-01

## 2024-04-01 RX ADMIN — MUPIROCIN: 20 OINTMENT TOPICAL at 07:04

## 2024-04-01 RX ADMIN — PROPOFOL 200 MG: 10 INJECTION, EMULSION INTRAVENOUS at 09:04

## 2024-04-01 RX ADMIN — LIDOCAINE HYDROCHLORIDE 100 MG: 20 INJECTION, SOLUTION INTRAVENOUS at 09:04

## 2024-04-01 RX ADMIN — MIDAZOLAM 2 MG: 1 INJECTION INTRAMUSCULAR; INTRAVENOUS at 09:04

## 2024-04-01 RX ADMIN — DEXAMETHASONE SODIUM PHOSPHATE 8 MG: 4 INJECTION, SOLUTION INTRA-ARTICULAR; INTRALESIONAL; INTRAMUSCULAR; INTRAVENOUS; SOFT TISSUE at 10:04

## 2024-04-01 RX ADMIN — KETOROLAC TROMETHAMINE 30 MG: 30 INJECTION, SOLUTION INTRAMUSCULAR; INTRAVENOUS at 10:04

## 2024-04-01 RX ADMIN — SODIUM CHLORIDE, SODIUM GLUCONATE, SODIUM ACETATE, POTASSIUM CHLORIDE AND MAGNESIUM CHLORIDE: 526; 502; 368; 37; 30 INJECTION, SOLUTION INTRAVENOUS at 07:04

## 2024-04-01 RX ADMIN — FENTANYL CITRATE 50 MCG: 50 INJECTION, SOLUTION INTRAMUSCULAR; INTRAVENOUS at 10:04

## 2024-04-01 RX ADMIN — CEFAZOLIN 2 G: 2 INJECTION, POWDER, FOR SOLUTION INTRAMUSCULAR; INTRAVENOUS at 09:04

## 2024-04-01 RX ADMIN — SODIUM CHLORIDE, SODIUM LACTATE, POTASSIUM CHLORIDE, AND CALCIUM CHLORIDE: .6; .31; .03; .02 INJECTION, SOLUTION INTRAVENOUS at 09:04

## 2024-04-01 RX ADMIN — ACETAMINOPHEN 1000 MG: 10 INJECTION, SOLUTION INTRAVENOUS at 10:04

## 2024-04-01 RX ADMIN — ONDANSETRON 4 MG: 2 INJECTION INTRAMUSCULAR; INTRAVENOUS at 10:04

## 2024-04-01 NOTE — DISCHARGE INSTRUCTIONS
General Information:    1.  Do not drink alcoholic beverages including beer for 24 hours or as long as you are on pain medication..  2.  Do not drive a motor vehicle, operate machinery or power tools, or signs legal papers for 24 hours or as long as you are on pain medication.   3.  You may experience light-headedness, dizziness, and sleepiness following surgery. Please do not stay alone. A responsible adult should be with you for this 24 hour period.  4.  Go home and rest.    5. Progress slowly to a normal diet unless instructed.  Otherwise, begin with liquids such as soft drinks, then soup and crackers working up to solid foods. Drink plenty of nonalcoholic fluids.  6.  Certain anesthetics and pain medications produce nausea and vomiting in certain       individuals. If nausea becomes a problem at home, call you doctor.    7. A nurse will be calling you sometime after surgery. Do not be alarmed. This is our way of finding out how you are doing.    8. Several times every hour while you are awake, take 2-3 deep breaths and cough. If you had stomach surgery hold a pillow or rolled towel firmly against your stomach before you cough. This will help with any pain the cough might cause.  9. Several times every hour while you are awake, pump and flex your feet 5-6 times and do foot circles. This will help prevent blood clots.    10.Call your doctor for severe pain, bleeding, fever, or signs or symptoms of infection (pain, swelling, redness, foul odor, drainage).  We hope your stay was comfortable as you heal now, mend and rest.    For we have enjoyed taking care of you by giving your our best.    And as you get better, by regaining your health and strength;   We count it as a privilege to have served you and hope your time at [facility] was well spent.      Thank  You!!!

## 2024-04-01 NOTE — DISCHARGE SUMMARY
Critical access hospital Services  Discharge Note  Short Stay    Procedure(s) (LRB):  HYSTEROSCOPY, WITH DILATION AND CURETTAGE OF UTERUS and other indicated procedures (N/A)      OUTCOME: Patient tolerated treatment/procedure well without complication and is now ready for discharge.    DISPOSITION: Home or Self Care    FINAL DIAGNOSIS:  Abnormal uterine bleeding (AUB)    FOLLOWUP: In clinic    DISCHARGE INSTRUCTIONS:    Discharge Procedure Orders   Diet general     Keep surgical extremity elevated     Ice to affected area     Lifting restrictions     Other restrictions (specify):   Order Comments: Discharge Instructions:  Follow-up in 2 weeks or as needed.  Call immediately for any abnormal pain bleeding fever chills nausea vomiting or other significant postoperative issues.    Pelvic rest until follow-up.  No tub baths until follow-up.  Diet as tolerated     Call MD for:  temperature >100.4     Call MD for:  persistent nausea and vomiting     Call MD for:  severe uncontrolled pain     Call MD for:  difficulty breathing, headache or visual disturbances     Call MD for:  redness, tenderness, or signs of infection (pain, swelling, redness, odor or green/yellow discharge around incision site)     Call MD for:  hives     Call MD for:  persistent dizziness or light-headedness     Call MD for:  extreme fatigue     Call MD for:   Order Comments: Discharge Instructions:  Follow-up in 2 weeks or as needed.  Call immediately for any abnormal pain bleeding fever chills nausea vomiting or other significant postoperative issues.    Pelvic rest until follow-up.  No tub baths until follow-up.  Diet as tolerated     No dressing needed     Activity as tolerated     Shower on day dressing removed (No bath)   Order Comments: No tub baths until patient seen for postoperative evaluation in the office         Clinical Reference Documents Added to Patient Instructions         Document    DILATION AND CURETTAGE (D AND C) (ENGLISH)     DILATION AND CURETTAGE DISCHARGE INSTRUCTIONS (ENGLISH)            TIME SPENT ON DISCHARGE: 10 minutes       Medication List        START taking these medications      ibuprofen 600 MG tablet  Commonly known as: ADVIL,MOTRIN  Take 1 tablet (600 mg total) by mouth every 6 (six) hours as needed for Pain.            CONTINUE taking these medications      HYDROmorphone 2 MG tablet  Commonly known as: DILAUDID  Take 1 tablet (2 mg total) by mouth every 4 (four) hours as needed for Pain.     multivitamin with minerals tablet            STOP taking these medications      diclofenac 75 MG EC tablet  Commonly known as: VOLTAREN     ondansetron 4 MG Tbdl  Commonly known as: ZOFRAN-ODT     valACYclovir 1000 MG tablet  Commonly known as: VALTREX               Where to Get Your Medications        These medications were sent to [id number] - [address] AT [facility] [address]  [address] [address]      Phone: [phone]   ibuprofen 600 MG tablet        Above reviewed with patient and family.    Discharge Instructions:  Follow-up in 2 weeks or as needed.  Call immediately for any abnormal pain bleeding fever chills nausea vomiting or other significant postoperative issues.    Pelvic rest until follow-up.  Diet as tolerated     The patient may be discharged home when she is ambulating, tolerating clear liquids, able to take pain medications by mouth, has no significant nausea and is in agreement with discharge plan.    Aquiles Martins MD  OBGYN [facility]

## 2024-04-01 NOTE — PLAN OF CARE
Pt prepped for surgery. Consents at bedside. Incentive spirometry taught by respiratory. Pt belongings placed in postop cabinet. Family member set up with text alerts.

## 2024-04-01 NOTE — H&P
"Subjective:   Chief Complaint:  Pelvic Pain           Subjective   Patient ID: Elena Ingram is a  49 y.o. female.     Contraception: Essure     Date: 3/27/2024     The patient presents today due to the following:  Beginning on the evening of 2024 the patient reports initial right lower quadrant pain which has now radiated to a central location.    She was evaluated in the emergency room since that time on two occasions.  Prior to each occasion she had a single episode of vomiting  Beginning on 2024 she noted the onset of heavy irregular bleeding.     ED evaluation was compatible for an endometrial cavity lesion and the patient presents today to discuss further evaluation.     No previous significant gyn issues.  She reports a normal Pap smear in 2023 with no history of abnormal Pap smears.        GYN & OB History  Patient's last menstrual period was 2024 (approximate).      Date of Last Pap: No result found  OB History            5    Para   4    Term   4            AB   1    Living   3           SAB   1    IAB        Ectopic        Multiple        Live Births                 Obstetric Comments   Vaginal Delivery x 4 (SIDS x 1)  SAB x1                   Allergies:         Review of patient's allergies indicates:   Allergen Reactions    Prochlorperazine Other (See Comments)       Other reaction(s): Muscle Pain, Unknown, Unknown  Pt reports drug as "Making my neck twitch".  neck twitching  "Makes her neck twitch"  Pt reports drug as "Making my neck twitch".  Pt reports drug as "Making my neck twitch".  "Makes her neck twitch"  Pt reports drug as "Making my neck twitch".       Oxycodone-acetaminophen Nausea And Vomiting and Other (See Comments)         History reviewed. No pertinent past medical history.           Past Surgical History:   Procedure Laterality Date    Essure Bilateral 2015         Medications     Current Outpatient Medications:     HYDROmorphone " (DILAUDID) 2 MG tablet, Take 1 tablet (2 mg total) by mouth every 4 (four) hours as needed for Pain., Disp: 12 tablet, Rfl: 0    multivitamin with minerals tablet, Body, Hair, Skin and Nails, Disp: , Rfl:     valACYclovir (VALTREX) 1000 MG tablet, Take 2 tablets (2,000 mg total) by mouth every 12 (twelve) hours., Disp: 4 tablet, Rfl: 0    azelastine (ASTELIN) 137 mcg (0.1 %) nasal spray, 1 spray (137 mcg total) by Nasal route 2 (two) times daily. (Patient not taking: Reported on 3/27/2024), Disp: 30 mL, Rfl: 0    diclofenac (VOLTAREN) 75 MG EC tablet, Take 1 tablet (75 mg total) by mouth 2 (two) times daily. (Patient not taking: Reported on 3/27/2024), Disp: 60 tablet, Rfl: 0    fluticasone propionate (FLONASE) 50 mcg/actuation nasal spray, 1 spray (50 mcg total) by Each Nostril route once daily. (Patient not taking: Reported on 3/27/2024), Disp: 15.8 mL, Rfl: 0    HYDROcodone-acetaminophen (NORCO) 5-325 mg per tablet, Take 1 tablet by mouth every 6 (six) hours as needed for Pain. (Patient not taking: Reported on 3/27/2024), Disp: 12 tablet, Rfl: 0    hyoscyamine (ANASPAZ,LEVSIN) 0.125 mg Tab, Take 3 tablets (375 mcg total) by mouth every 6 (six) hours as needed., Disp: 20 tablet, Rfl: 1    ondansetron (ZOFRAN) 8 MG tablet, Take 1 tablet (8 mg total) by mouth every 12 (twelve) hours as needed for Nausea. (Patient not taking: Reported on 12/24/2023), Disp: 8 tablet, Rfl: 1    ondansetron (ZOFRAN-ODT) 4 MG TbDL, Take 1 tablet (4 mg total) by mouth every 6 (six) hours as needed (nausea). (Patient not taking: Reported on 3/27/2024), Disp: 15 tablet, Rfl: 0  No current facility-administered medications for this visit.      Social History           Tobacco Use    Smoking status: Never    Smokeless tobacco: Never         History reviewed. No pertinent family history.     Review of Systems (at today's evaluation)  Review of Systems   Constitutional:  Negative for fever and unexpected weight change.   Respiratory: Negative.      Cardiovascular:  Negative for chest pain and palpitations.   Gastrointestinal:  Positive for abdominal pain, nausea and vomiting.   Genitourinary:  Negative for dysuria, hematuria and urgency.        Gyn as per HPI   Neurological:  Negative for headaches.         Objective:         Objective       Vitals:     03/27/24 1306   BP: 114/72      Physical Exam:   Constitutional: She appears well-developed and well-nourished.    HENT:   Head: Normocephalic.     Neck: No thyroid mass present.    Cardiovascular:  Normal rate.             Pulmonary/Chest: Effort normal. No respiratory distress.         Abdominal: Soft. There is no abdominal tenderness.     Genitourinary:    Inguinal canal, vagina, right adnexa and left adnexa normal.      Pelvic exam was performed with patient supine.   The external female genitalia was normal.   No external genitalia lesions identified,Cervix is normal. Right adnexum displays no mass and no tenderness. Left adnexum displays no mass and no tenderness. No tenderness or bleeding in the vagina. Uterus is tender. Uterus is not enlarged. Normal urethral meatus.Urethra findings: no tendernessBladder findings: no bladder tenderness   Genitourinary Comments: A chaperone (female medical assistant) was present throughout the pelvic exam.             Musculoskeletal: Normal range of motion.      Right lower leg: No edema.      Left lower leg: No edema.      Lymphadenopathy:     She has no cervical adenopathy. No inguinal adenopathy noted on the right or left side.    Neurological: She is alert.    Skin: Skin is warm and dry.    Psychiatric: Mood normal.            Recent Laboratory Results:     Results     Collected Updated Procedure     03/25/2024 1250 03/25/2024 1301 POCT urine pregnancy [[id number]] Component Value   POC Preg Test, Ur Negative    Acceptable Yes            03/25/2024 1104 03/25/2024 1144 Urinalysis, Reflex to Urine Culture Urine, Clean Catch [[id number]]    (Abnormal)    Urine    Component Value Units   Specimen UA Urine, Clean Catch     Color, UA Colorless Abnormal      Appearance, UA Clear     pH, UA 8.0     Specific Gravity, UA >1.030 Abnormal      Protein, UA Negative      Glucose, UA Negative     Ketones, UA Negative     Bilirubin (UA) Negative     Occult Blood UA Negative     Nitrite, UA Negative     Urobilinogen, UA Negative EU/dL   Leukocytes, UA Negative              03/25/2024 0900 03/25/2024 0944 Lipase [[id number]]   Blood    Component Value Units   Lipase 26 U/L            03/25/2024 0900 03/25/2024 0944 Comp. Metabolic Panel [[id number]]   (Abnormal)   Blood    Component Value Units   Sodium 135 Low  mmol/L   Potassium 3.9 mmol/L   Chloride 103 mmol/L   CO2 21 Low  mmol/L   Glucose 134 High  mg/dL   BUN 9 mg/dL   Creatinine 0.8 mg/dL   Calcium 9.7 mg/dL   Total Protein 7.8 g/dL   Albumin 3.9 g/dL   Total Bilirubin 0.3  mg/dL   Alkaline Phosphatase 59 U/L   AST 12 U/L   ALT 15 U/L   eGFR >60 mL/min/1.73 m^2   Anion Gap 11 mmol/L            03/25/2024 0900 03/25/2024 0916 CBC W/ AUTO DIFFERENTIAL [[id number]]   (Abnormal)   Blood    Component Value Units   WBC 8.97 K/uL   RBC 5.75 High  M/uL   Hemoglobin 13.2 g/dL   Hematocrit 41.4 %   MCV 72 Low  fL   MCH 23.0 Low  pg   MCHC 31.9 Low  g/dL   RDW 15.7 High  %   Platelets 314 K/uL   MPV 9.4 fL   Immature Granulocytes 0.1 %   Gran # (ANC) 7.1 K/uL   Immature Grans (Abs) 0.01  K/uL   Lymph # 1.3 K/uL   Mono # 0.5 K/uL   Eos # 0.0 K/uL   Baso # 0.03 K/uL   nRBC 0 /100 WBC   Gran % 79.3 High  %   Lymph % 14.9 Low  %   Mono % 5.4 %   Eosinophil % 0.0 %   Basophil % 0.3 %   Differential Method Automated                        Recent Radiology Results:     3/25/2024 STAT      Narrative & Impression  EXAMINATION:  CT ABDOMEN PELVIS WITH IV CONTRAST     CLINICAL HISTORY:  RLQ abdominal pain (Age >= 14y);     FINDINGS:  Dependent hypoventilatory change and small bladder cyst in the right posterior lung base similar in appearance to  the prior study     Liver and spleen unremarkable appearance.  No calcified stones in the gallbladder or CT findings of acute cholecystitis.  No biliary duct dilatation.  Pancreas and adrenal glands unremarkable appearance.  The abdominal aorta tapers without aneurysmal dilatation     Very tiny fat containing umbilical hernia     Normal appearance of the appendix.  No free intraperitoneal air.  Trace free fluid the pelvis.     Renal enhancement is symmetrical and there is no hydronephrosis.  Small subcentimeter hypodensity in the right kidney suggesting cyst.  Urinary bladder is mildly distended at time of the exam and as visualized is unremarkable appearance     Reproductive organs; enlarged uterus.  Endometrial canal also appears thickened and uterus slightly heterogeneous.  Endometrial canal measured approximately 1.8 cm.  Tubal ligation structures suggesting Essure devices appear to extend into the uterus bilaterally particularly.  This is not significantly changed.  2.9 cm oval right adnexal cyst and 1.6 cm left adnexal cyst suggesting bilateral ovarian cyst     Osseous structures show mild degenerative changes without obvious aggressive appearing osseous lesion     Impression:     Uterus appears enlarged with the central endometrial canal thick.  Contraceptive devices as described.  Bilateral ovarian cysts.  Trace free fluid the pelvis not more so than can be seen on a physiologic basis.  Normal appearance of the appendix        3/25/2024 STAT      Narrative & Impression  EXAMINATION:  US PELVIS COMP WITH TRANSVAG NON-OB (XPD)     CLINICAL HISTORY:  rlq pain. CT neg. r/o torsion;     FINDINGS:  Uterus:     Size: 9.2 x 7.4 cm     Masses: In the lumen there is a collection of fluid and solid material which is rather echogenic.  Within the fluid collection is a small polyp measuring 8 mm in size length.  A small echogenic probable calcification is identified in the myometrium.     Endometrium: Elsewhere the  endometrial lining is narrow measuring 2.5 mm     Right ovary:     Size: 3.3 x 3.1 cm     Appearance: Normal     Vascular flow: Normal.     Left ovary:     Size: 2.9 x 2 cm     Appearance: Normal     Vascular Flow: Normal.     Free Fluid:     None.     Impression:     There is a complex part solid and part fluid collection identified in the endometrial cavity measuring 3.2 cm by 2.5 cm.  In the fluid component there is a an 8 mm polyp.  The echogenic solid material is suspicious for neoplasm.  No evidence of ovarian torsion is noted.     Assessment:         Assessment   1. Pelvic pain    2. Abnormal uterine bleeding (AUB)    3. Abnormal ultrasound of endometrium          Plan:         Plan   Pelvic pain  -     Place in Outpatient; Standing  -     Full code; Standing  -     Vital signs; Standing  -     Bed rest with bathroom privileges; Standing  -     Insert peripheral IV; Standing  -     Chlorhexidine (CHG) 2% Wipes; Standing  -     Notify Physician/Vital Signs Parameters Urine output less than 0.5mL/kg/hr (with indwelling catheter) or 30 mL/hr (without indwelling catheter) or blood glucose greater than 200 mg/dL; Standing  -     Notify physician; Standing  -     Notify Physician - Potential Need of Opioid Reversal; Standing  -     Diet NPO; Standing  -     Chlorohexidine Gluconate Bath; Standing  -     Case Request Operating Room: HYSTEROSCOPY, WITH DILATION AND CURETTAGE OF UTERUS and other indicated procedures  -     Place sequential compression device; Standing     Abnormal uterine bleeding (AUB)     Abnormal ultrasound of endometrium     Other orders  -     0.9%  NaCl infusion  -     IP VTE LOW RISK PATIENT; Standing  -     cefazolin (ANCEF) 2 gram in dextrose 5% 50 mL IVPB (premix)  -     mupirocin 2 % ointment        Follow up for As needed or 2 weeks following surgery.      The above was reviewed and discussed with the patient.     We discussed her issues with pelvic pain abnormal bleeding and findings on  both CT scan and pelvic ultrasound..     Conservative, medical, and surgical interventions were discussed, and the patient would like to proceed with surgical intervention.  She will be scheduled for a HYSTEROSCOPY, DILATION AND CURETTAGE AND OTHER INDICATED PROCEDURES     The pros, cons, risks, benefits, alternatives, and indications of the surgical procedure were discussed in detail with the patient.  We discussed the possibility of allergic reactions, bleeding, infection damage to surrounding structures (cervix, uterus, bladder, ureters, bowel) and other abdominal pelvic organs.  Issues unique to this procedure were discussed.     The patient's questions regarding above were answered and she agrees with this plan.  The patient was provided with the informed consent form and given times reviewed the form.  Questions were answered and consent was obtained     The patient's questions were answered, and she is in agreement with the current plan.      Aquiles Martins MD  Department OBGYN [facility]

## 2024-04-01 NOTE — ANESTHESIA PREPROCEDURE EVALUATION
04/01/2024  Elena Ingram is a 49 y.o., female.      Pre-op Assessment    I have reviewed the Patient Summary Reports.     I have reviewed the Nursing Notes. I have reviewed the NPO Status.   I have reviewed the Medications.     Review of Systems  Anesthesia Hx:             Denies Family Hx of Anesthesia complications.    Denies Personal Hx of Anesthesia complications.                    OB/GYN/PEDS:  Chronic pelvic pain               Physical Exam  General: Well nourished, Cooperative, Alert and Oriented    Airway:  Mallampati: II   Mouth Opening: Normal  TM Distance: Normal  Tongue: Normal  Neck ROM: Normal ROM        Anesthesia Plan  Type of Anesthesia, risks & benefits discussed:    Anesthesia Type: Gen Supraglottic Airway  Intra-op Monitoring Plan: Standard ASA Monitors  Post Op Pain Control Plan: multimodal analgesia  Induction:  IV  Airway Plan: , Post-Induction  Informed Consent: Informed consent signed with the Patient and all parties understand the risks and agree with anesthesia plan.  All questions answered.   ASA Score: 1    Ready For Surgery From Anesthesia Perspective.     .

## 2024-04-01 NOTE — PLAN OF CARE
Pt voiding spontaneously without difficulty. Pt and family given discharge instructions. Prescriptions sent to pt's pharmacy. Educated on post anesthesia precautions, dressing care, and when to notify MD. All belongings returned to pt. Transferred to personal vehicle via wheelchair.

## 2024-04-01 NOTE — PLAN OF CARE
,Release per anesthesia vital signs stable instructed on IS no n&v  encouraged deep breaths has all belongings.  Due to void.

## 2024-04-01 NOTE — ANESTHESIA PROCEDURE NOTES
Intubation    Date/Time: 4/1/2024 9:59 AM    Performed by: Kristina Nieto CRNA  Authorized by: Kristina Nieto CRNA    Intubation:     Induction:  Intravenous    Intubated:  Postinduction    Mask Ventilation:  Easy mask    Attempts:  1    Attempted By:  CRNA    Difficult Airway Encountered?: No      Complications:  None    Airway Device:  Supraglottic airway/LMA    Airway Device Size:  4.0    Placement Verified By:  Capnometry    Complicating Factors:  None    Findings Post-Intubation:  BS equal bilateral and atraumatic/condition of teeth unchanged

## 2024-04-01 NOTE — INTERVAL H&P NOTE
The patient has been examined and the H&P has been reviewed:    I concur with the findings and no changes have occurred since H&P was written.    Surgery risks, benefits and alternative options discussed and understood by patient/family.    Impression:  There is a complex part solid and part fluid collection identified in the endometrial cavity measuring 3.2 cm by 2.5 cm.  In the fluid component there is a an 8 mm polyp.  The echogenic solid material is suspicious for neoplasm.  No evidence of ovarian torsion is noted.    Assessment:   1.           Pelvic pain   2.            Abnormal uterine bleeding (AUB)   3.            Abnormal ultrasound of endometrium     Plan: HYSTEROSCOPY, DILATION AND CURETTAGE AND OTHER INDICATED PROCEDURES      Active Hospital Problems    Diagnosis  POA    *Abnormal uterine bleeding (AUB) [N93.9]  Yes    Pelvic pain [R10.2]  Yes    Abnormal pelvic ultrasound [R93.89]  Yes      Resolved Hospital Problems   No resolved problems to display.

## 2024-04-01 NOTE — OP NOTE
[facility]  Department of Obstetrics & Gynecology  Operative Note      PATIENT NAME: Elena Ingram    MRN: [id number]  TODAY'S DATE: 04/01/2024  ADMIT DATE: 4/1/2024                              OPERATIVE REPORT:  4/1/2024    SURGEON: Aquiles Martins MD    ASSISTANT:  None    PREOPERATIVE DIAGNOSIS:   Abnormal uterine bleeding  Pelvic pain   Abnormal endometrium on ultrasound (thickened endometrial cavity    POSTOPERATIVE DIAGNOSIS:   Abnormal uterine bleeding  Pelvic pain   Abnormal endometrium on ultrasound (thickened endometrial cavity    PROCEDURE:   1.  Hysteroscopy  2.  Dilation and curettage (via MyoSure Lite device)     ANESTHESIA: General anesthesia with LMA    ESTIMATED BLOOD LOSS: Minimal    FLUIDS: 600 mL of crystalloid.    URINE OUTPUT: 100 cc    PATHOLOGY:  Endometrial curettage    FINDINGS:   Normal-appearing vaginal vault, cervix and endocervical canal.      The uterus sounded to 9 cm.  Normal endometrial cavity without visible myoma or polyps.  Bilateral tubal ostia visualized.         PROCEDURE IN DETAIL:   Prior to the procedure the patient was seen and evaluated in the preoperative area.  Potential risks associated with the surgery were once again reviewed and discussed.  The patient's questions were answered and she is in agreement with proceeding with the current plan..    The patient was subsequently taken to the [facility] operating room, where general anesthesia with LMA was initiated by the anesthesia department.      The patient was prepped and draped in the usual sterile fashion in the dorsal lithotomy position in the [facility].  The bladder was drained of approximately 100 cc of clear urine.      At this time a time-out/safety procedure was performed with all participating parties in agreement as to the preoperative and operative plan.  Sequential compression hose were on the patient.    A bivalved speculum was introduced into the vagina and the  anterior lip of the cervix was grasped with a the single-tooth tenaculum. The uterus was gently sounded and the cervix was gently dilated to allow for the hysteroscope.     At this time hysteroscopic evaluation of the endometrial cavity was performed with the findings as mentioned above noted.    Following visualization of all four quadrants of the endometrial cavity, a curettage was performed under direct visualization.  Curettage included areas of thickened tissue anterior and posteriorly.  Following curettage, good hemostasis with an intact appearing cavity were noted.      The hysteroscope was then removed, following which, the tenaculum was removed from the anterior cervix with good hemostasis noted from the tenaculum site as well as the cervical os..     Final fluid deficit: 65 cc.    Final count was reported as correct per nursing.  The patient tolerated the procedure well.    Aquiles Martins M.D., FACOG

## 2024-04-01 NOTE — TRANSFER OF CARE
"Anesthesia Transfer of Care Note    Patient: Elena Ingram    Procedure(s) Performed: Procedure(s) (LRB):  HYSTEROSCOPY, WITH DILATION AND CURETTAGE OF UTERUS and other indicated procedures (N/A)    Patient location: PACU    Anesthesia Type: general    Transport from OR: Transported from OR on room air with adequate spontaneous ventilation    Post pain: adequate analgesia    Post assessment: no apparent anesthetic complications    Post vital signs: stable    Level of consciousness: awake and alert    Complications: none    Transfer of care protocol was followed      Last vitals: Visit Vitals  BP (!) 163/95 (BP Location: Left arm, Patient Position: Lying)   Pulse 70   Temp 36.5 °C (97.7 °F) (Temporal)   Resp 18   Ht 5' 5" (1.651 m)   Wt 68.9 kg (152 lb)   LMP 01/25/2024 (Approximate)   SpO2 100%   Breastfeeding No   BMI 25.29 kg/m²     "

## 2024-04-02 VITALS
OXYGEN SATURATION: 98 % | HEART RATE: 82 BPM | SYSTOLIC BLOOD PRESSURE: 171 MMHG | DIASTOLIC BLOOD PRESSURE: 88 MMHG | BODY MASS INDEX: 25.33 KG/M2 | TEMPERATURE: 98 F | RESPIRATION RATE: 18 BRPM | HEIGHT: 65 IN | WEIGHT: 152 LBS

## 2024-04-02 NOTE — ANESTHESIA POSTPROCEDURE EVALUATION
Anesthesia Post Evaluation    Patient: Elena Ingram    Procedure(s) Performed: Procedure(s) (LRB):  HYSTEROSCOPY, WITH DILATION AND CURETTAGE OF UTERUS and other indicated procedures (N/A)    Final Anesthesia Type: general      Patient location during evaluation: PACU  Patient participation: Yes- Able to Participate  Level of consciousness: awake and alert  Post-procedure vital signs: reviewed and stable  Pain management: adequate  Airway patency: patent    PONV status at discharge: No PONV  Anesthetic complications: no      Cardiovascular status: blood pressure returned to baseline  Respiratory status: unassisted  Hydration status: euvolemic  Follow-up not needed.              Vitals Value Taken Time   /88 04/01/24 1220   Temp 36.5 °C (97.7 °F) 04/01/24 1037   Pulse 82 04/01/24 1220   Resp 18 04/01/24 1220   SpO2 98 % 04/01/24 1220         Event Time   Out of Recovery 12:00:00         Pain/Wendy Score: Wendy Score: 10 (4/1/2024 12:31 PM)  Modified Wendy Score: 20 (4/1/2024 12:31 PM)

## 2024-04-14 ENCOUNTER — PATIENT MESSAGE (OUTPATIENT)
Dept: FAMILY MEDICINE | Facility: CLINIC | Age: 49
End: 2024-04-14

## 2024-04-17 ENCOUNTER — OFFICE VISIT (OUTPATIENT)
Dept: OBSTETRICS AND GYNECOLOGY | Facility: CLINIC | Age: 49
End: 2024-04-17
Payer: COMMERCIAL

## 2024-04-17 VITALS
DIASTOLIC BLOOD PRESSURE: 68 MMHG | BODY MASS INDEX: 25.75 KG/M2 | WEIGHT: 154.56 LBS | SYSTOLIC BLOOD PRESSURE: 110 MMHG | HEIGHT: 65 IN

## 2024-04-17 DIAGNOSIS — Z98.890 POSTOPERATIVE STATE: Primary | ICD-10-CM

## 2024-04-17 DIAGNOSIS — Z12.31 ENCOUNTER FOR SCREENING MAMMOGRAM FOR MALIGNANT NEOPLASM OF BREAST: ICD-10-CM

## 2024-04-17 DIAGNOSIS — Z00.00 GENERAL MEDICAL EXAMINATION: ICD-10-CM

## 2024-04-17 DIAGNOSIS — N93.9 ABNORMAL UTERINE BLEEDING (AUB): ICD-10-CM

## 2024-04-17 PROCEDURE — 1159F MED LIST DOCD IN RCRD: CPT | Mod: CPTII,S$GLB,, | Performed by: OBSTETRICS & GYNECOLOGY

## 2024-04-17 PROCEDURE — 3074F SYST BP LT 130 MM HG: CPT | Mod: CPTII,S$GLB,, | Performed by: OBSTETRICS & GYNECOLOGY

## 2024-04-17 PROCEDURE — 99999 PR PBB SHADOW E&M-EST. PATIENT-LVL III: CPT | Mod: PBBFAC,,, | Performed by: OBSTETRICS & GYNECOLOGY

## 2024-04-17 PROCEDURE — 3078F DIAST BP <80 MM HG: CPT | Mod: CPTII,S$GLB,, | Performed by: OBSTETRICS & GYNECOLOGY

## 2024-04-17 PROCEDURE — 99024 POSTOP FOLLOW-UP VISIT: CPT | Mod: S$GLB,,, | Performed by: OBSTETRICS & GYNECOLOGY

## 2024-04-17 NOTE — PROGRESS NOTES
Subjective:   Chief Complaint:  Post-op Evaluation (D&C )       Patient ID: Elena Ingram is a  49 y.o. female.    Date: 2024     The patient is status post: {postoperative:92206} *** on ***.  Surgery Indication(s): ***    From a postoperative standpoint she is currently doing well and without complaints.  ***  She denies any postoperative fevers. ***  No significant postoperative GI or urologic issues.  ***  No significant postoperative pain. ***  No significant postoperative bleeding. ***    Pathology: DIAGNOSIS:   2024 RDC:w     ENDOMETRIAL CURETTAGE:   - PROLIFERATIVE ENDOMETRIUM WITH FOCAL STROMAL HYALINIZATION AND    HEMOSIDERIN PIGMENT DEPOSIT CONSISTENT WITH INCOMPLETE SHEDDING.   - NEGATIVE FOR ATYPICAL ENDOMETRIAL HYPERPLASIA OR MALIGNANCY.   - ENDOCERVICAL MUCOSA WITH MILD CHRONIC INFLAMMATION.   - NEGATIVE FOR DYSPLASIA OR MALIGNANCY.         GYN & OB History  Patient's last menstrual period was 2024 (approximate).     Date of Last Pap: No result found    OB History    Para Term  AB Living   5 4 4   1 3   SAB IAB Ectopic Multiple Live Births   1              # Outcome Date GA Lbr Lemuel/2nd Weight Sex Type Anes PTL Lv   5 SAB            4 Term            3 Term            2 Term            1 Term               Obstetric Comments   Vaginal Delivery x 4 (SIDS x 1)   SAB x1       No past medical history on file.     Past Surgical History:   Procedure Laterality Date    DILATION AND CURETTAGE OF UTERUS      Essure Bilateral     HYSTEROSCOPY WITH DILATION AND CURETTAGE OF UTERUS N/A 2024    Procedure: HYSTEROSCOPY, WITH DILATION AND CURETTAGE OF UTERUS and other indicated procedures;  Surgeon: Aquiles Martins MD;  [facility];  Service: OB/GYN;  Laterality: N/A;        Review of Systems  Review of Systems        Objective:      Vitals:    24 1552   BP: 110/68     Physical Exam       Assessment:        1. Postoperative state    2. Abnormal  uterine bleeding (AUB)    3. General medical examination          Plan:      Postoperative state    Abnormal uterine bleeding (AUB)    General medical examination         Follow up for as needed / for any GYN related issues.     The above was reviewed discussed with the patient.  We discussed the findings that time of surgery as well as pathology.  We reviewed the photographic images. ***    From a postoperative standpoint the patient is currently doing well.  We will base further evaluation treatment on the patient's status over time.  ***    The patient's questions regarding the above were answered and she is in agreement with the current plan.    Aquiles Martins MD  Department OBGYN  [facility]

## 2024-04-20 NOTE — PROGRESS NOTES
Subjective:   Chief Complaint:  Post-op Evaluation (D&C )       Patient ID: Elena Ingram is a  49 y.o. female.    Date: 2024     The patient is status post: Hysteroscopy, dilation curettage on 2024.  Surgery Indication(s): Abnormal uterine bleeding & Abnormal ultrasound of endometrium     From a postoperative standpoint she is currently doing well and without complaints.    She denies any postoperative fevers.   No significant postoperative GI or urologic issues.    No significant postoperative pain.   No significant postoperative bleeding.     She requests referral for primary care physician and prescription for screening mammogram.    Pathology: ENDOMETRIAL CURETTAGE:   - PROLIFERATIVE ENDOMETRIUM WITH FOCAL STROMAL HYALINIZATION AND    HEMOSIDERIN PIGMENT DEPOSIT CONSISTENT WITH INCOMPLETE SHEDDING.   - NEGATIVE FOR ATYPICAL ENDOMETRIAL HYPERPLASIA OR MALIGNANCY.   - ENDOCERVICAL MUCOSA WITH MILD CHRONIC INFLAMMATION.   - NEGATIVE FOR DYSPLASIA OR MALIGNANCY.         GYN & OB History  Patient's last menstrual period was 2024 (approximate).     Date of Last Pap: No result found    OB History    Para Term  AB Living   5 4 4   1 3   SAB IAB Ectopic Multiple Live Births   1              # Outcome Date GA Lbr Lemuel/2nd Weight Sex Type Anes PTL Lv   5 SAB            4 Term            3 Term            2 Term            1 Term               Obstetric Comments   Vaginal Delivery x 4 (SIDS x 1)   SAB x1       No past medical history on file.     Past Surgical History:   Procedure Laterality Date    DILATION AND CURETTAGE OF UTERUS      Essure Bilateral     HYSTEROSCOPY WITH DILATION AND CURETTAGE OF UTERUS N/A 2024    Procedure: HYSTEROSCOPY, WITH DILATION AND CURETTAGE OF UTERUS and other indicated procedures;  Surgeon: Aquiles Martins MD;  Location: [facility];  Service: OB/GYN;  Laterality: N/A;        Review of Systems  Review of Systems   Constitutional:   Negative for fever.        As per HPI, otherwise no significant postoperative unexpected issues are noted   Respiratory: Negative.  Negative for shortness of breath.    Cardiovascular:  Negative for chest pain and palpitations.   Gastrointestinal:  Negative for abdominal pain, constipation, nausea and vomiting.   Genitourinary:  Negative for dysuria and hematuria.   Neurological: Negative.            Objective:      Vitals:    04/17/24 1552   BP: 110/68     Physical Exam:   Constitutional: She appears well-developed and well-nourished.    HENT:   Head: Normocephalic.     Neck: No thyroid mass present.    Cardiovascular:  Normal rate.             Pulmonary/Chest: Effort normal. No respiratory distress.        Abdominal: Soft. There is no abdominal tenderness.             Musculoskeletal: Normal range of motion.      Right lower leg: No edema.      Left lower leg: No edema.       Neurological: She is alert.   No gross lesions noted.    Skin: Skin is warm and dry.    Psychiatric: She has a normal mood and affect. Her speech is normal and behavior is normal. Mood normal.          Assessment:        1. Postoperative state    2. Abnormal uterine bleeding (AUB)    3. General medical examination    4. Encounter for screening mammogram for malignant neoplasm of breast          Plan:      Postoperative state    Abnormal uterine bleeding (AUB)    General medical examination  -     Ambulatory referral/consult to Family Practice; Future; Expected date: 04/24/2024    Encounter for screening mammogram for malignant neoplasm of breast  -     Mammo Digital Screening Bilat w/ [facility]; Future; Expected date: 04/17/2024       Follow up for as needed / for any GYN related issues.     The above was reviewed discussed with the patient.  We discussed the findings that time of surgery as well as pathology.  We reviewed the photographic images.     From a postoperative standpoint the patient is currently doing well.  We will base further  evaluation treatment on the patient's status over time.      The patient's questions regarding the above were answered and she is in agreement with the current plan.    Aquiles Martins MD  Department O[facility]  [facility]

## 2024-04-21 ENCOUNTER — HOSPITAL ENCOUNTER (EMERGENCY)
Facility: HOSPITAL | Age: 49
Discharge: HOME | End: 2024-04-21
Attending: EMERGENCY MEDICINE
Payer: COMMERCIAL

## 2024-04-21 VITALS
WEIGHT: 152 LBS | TEMPERATURE: 98 F | OXYGEN SATURATION: 98 % | RESPIRATION RATE: 18 BRPM | HEART RATE: 77 BPM | SYSTOLIC BLOOD PRESSURE: 130 MMHG | HEIGHT: 65 IN | DIASTOLIC BLOOD PRESSURE: 89 MMHG | BODY MASS INDEX: 25.33 KG/M2

## 2024-04-21 DIAGNOSIS — H10.9 CONJUNCTIVITIS, UNSPECIFIED CONJUNCTIVITIS TYPE, UNSPECIFIED LATERALITY: Primary | ICD-10-CM

## 2024-04-21 PROCEDURE — 99283 EMERGENCY DEPT VISIT LOW MDM: CPT

## 2024-04-21 PROCEDURE — 25000003 PHARM REV CODE 250: Performed by: EMERGENCY MEDICINE

## 2024-04-21 RX ORDER — MOXIFLOXACIN 5 MG/ML
1 SOLUTION/ DROPS OPHTHALMIC
Status: COMPLETED | OUTPATIENT
Start: 2024-04-21 | End: 2024-04-21

## 2024-04-21 RX ORDER — PROPARACAINE HYDROCHLORIDE 5 MG/ML
1 SOLUTION/ DROPS OPHTHALMIC
Status: DISCONTINUED | OUTPATIENT
Start: 2024-04-21 | End: 2024-04-21

## 2024-04-21 RX ORDER — PROPARACAINE HYDROCHLORIDE 5 MG/ML
1 SOLUTION/ DROPS OPHTHALMIC
Status: COMPLETED | OUTPATIENT
Start: 2024-04-21 | End: 2024-04-21

## 2024-04-21 RX ORDER — MOXIFLOXACIN 5 MG/ML
1 SOLUTION/ DROPS OPHTHALMIC 3 TIMES DAILY
Qty: 3 ML | Refills: 0 | Status: SHIPPED | OUTPATIENT
Start: 2024-04-21 | End: 2024-04-28

## 2024-04-21 RX ADMIN — NAPHAZOLINE HYDROCHLORIDE AND PHENIRAMINE MALEATE 1 DROP: .25; 3 SOLUTION/ DROPS OPHTHALMIC at 05:04

## 2024-04-21 RX ADMIN — MOXIFLOXACIN 1 DROP: 5 SOLUTION/ DROPS OPHTHALMIC at 05:04

## 2024-04-21 RX ADMIN — PROPARACAINE HYDROCHLORIDE 1 DROP: 5 SOLUTION/ DROPS OPHTHALMIC at 04:04

## 2024-04-21 RX ADMIN — FLUORESCEIN SODIUM 1 EACH: 1 STRIP OPHTHALMIC at 04:04

## 2024-04-21 NOTE — ED PROVIDER NOTES
"Encounter Date: 4/21/2024       History     Chief Complaint   Patient presents with    Facial Swelling     I think something got in my eye last night when I was walking; I woke up this morning and it looked like this     49-year-old female presents to the emergency room with left upper and lower eyelid swelling, eye redness and drainage.  No pain or itching.  She was outside yesterday and felt like a bug might have gone into her eye.  She has no foreign body sensation at this time.  No chronic medical problems no visual changes.    The history is provided by the patient.     Review of patient's allergies indicates:   Allergen Reactions    Prochlorperazine Other (See Comments)     Other reaction(s): Muscle Pain, Unknown, Unknown  Pt reports drug as "Making my neck twitch".  neck twitching  "Makes her neck twitch"  Pt reports drug as "Making my neck twitch".  Pt reports drug as "Making my neck twitch".  "Makes her neck twitch"  Pt reports drug as "Making my neck twitch".      Oxycodone-acetaminophen Nausea Only and Other (See Comments)     No past medical history on file.  Past Surgical History:   Procedure Laterality Date    DILATION AND CURETTAGE OF UTERUS      Essure Bilateral 2015    HYSTEROSCOPY WITH DILATION AND CURETTAGE OF UTERUS N/A 04/01/2024    Procedure: HYSTEROSCOPY, WITH DILATION AND CURETTAGE OF UTERUS and other indicated procedures;  Surgeon: Aquiles Martins MD;  Location: [facility];  Service: OB/GYN;  Laterality: N/A;     No family history on file.  Social History     Tobacco Use    Smoking status: Never    Smokeless tobacco: Never   Substance Use Topics    Alcohol use: Yes     Comment: OCCASIONAL    Drug use: Never     Review of Systems   Eyes:  Positive for discharge and redness. Negative for photophobia, pain and itching.       Physical Exam     Initial Vitals [04/21/24 1512]   BP Pulse Resp Temp SpO2   130/89 77 18 98.1 °F (36.7 °C) 98 %      MAP       --         Physical Exam    Nursing note and " vitals reviewed.  Constitutional: She appears well-developed and well-nourished.   HENT:   Head: Normocephalic and atraumatic.   Eyes: EOM are normal. Left eye exhibits chemosis and discharge. Left eye exhibits no exudate and no hordeolum. No foreign body present in the left eye. Left conjunctiva is injected.   Slit lamp exam:       The left eye shows no corneal abrasion and no fluorescein uptake.   Left eye is swollen shut with clear watery drainage, she has chemosis and conjunctival irritation, no evident foreign body under the upper or lower eyelid    No fluorescein uptake no evidence of any penetrating trauma   Neck: Neck supple.   Normal range of motion.  Pulmonary/Chest: No respiratory distress.   Musculoskeletal:         General: Normal range of motion.      Cervical back: Normal range of motion and neck supple.     Neurological: She is alert and oriented to person, place, and time.   Skin: Skin is warm and dry.   Psychiatric: She has a normal mood and affect. Her behavior is normal. Judgment and thought content normal.         ED Course   Procedures  Labs Reviewed - No data to display       Imaging Results    None          Medications   fluorescein ophthalmic strip 1 each (1 each Both Eyes Given 4/21/24 1650)   proparacaine 0.5 % ophthalmic solution 1 drop (1 drop Left Eye Given 4/21/24 1650)   moxifloxacin 0.5 % ophthalmic solution 1 drop (1 drop Left Eye Given 4/21/24 1730)   naphazoline-pheniramine 0.025-0.3% ophthalmic solution 1 drop (1 drop Left Eye Given 4/21/24 1730)     Medical Decision Making  3948 49-year-old female presents to the ER with left upper and lower eyelid swelling.  Symptoms began today.  Felt like a bug flew into her eye yesterday, no residual foreign body sensation.  I do not see anything under the upper or lower eyelid.  Eyes injected with ecchymosis and swelling with some drainage.  I will place her on antibiotic drops and antihistamine drops.  Follow-up Ophthalmology if symptoms  persist.  She has absolutely no pain making orbital cellulitis very unlikely I do not think she needs a CT of the orbits or systemic antibiotics.  She has no pain no systemic complaints.  She states this all started after she felt like something flew into her left eye.    Risk  OTC drugs.  Prescription drug management.               ED Course as of 04/21/24 1912   Sun Apr 21, 2024   1630 BP: 130/89 [EF]   1630 Temp: 98.1 °F (36.7 °C) [EF]   1630 Temp Source: Oral [EF]   1630 Pulse: 77 [EF]   1630 Resp: 18 [EF]   1630 SpO2: 98 % [EF]   1708 49-year-old female presents to the ER with left eye swelling.  Symptoms began today.  Felt like a bug flew into her eye yesterday, no residual foreign body sensation.  I do not see anything under the upper or lower eyelid.  Eyes injected with ecchymosis and swelling with some drainage.  I will place her on antibiotic drops and antihistamine drops.  Follow-up Ophthalmology if symptoms persist. [EF]      ED Course User Index  [EF] Gabe Aguilar MD                           Clinical Impression:  Final diagnoses:  [H10.9] Conjunctivitis, unspecified conjunctivitis type, unspecified laterality (Primary)          ED Disposition Condition    Discharge Stable          ED Prescriptions       Medication Sig Dispense Start Date End Date Auth. Provider    moxifloxacin (VIGAMOX) 0.5 % ophthalmic solution Place 1 drop into the left eye 3 (three) times daily. for 7 days 3 mL 4/21/2024 4/28/2024 Gabe Aguilar MD          Follow-up Information       Follow up With Specialties Details Why Contact Info Additional Information    Ezequiel Yañez, OD Optometry Schedule an appointment as soon as possible for a visit   [address] [place] [address] [address]  [place] [address]  [phone]       [facility] Emergency Medicine  As needed, If symptoms worsen [address] [place] [address] [address] [place] [id number] 1st floor             Gabe Aguilar MD  04/21/24 1912

## 2024-04-21 NOTE — Clinical Note
"Elena Monroelazaro Ingram was seen and treated in our emergency department on 4/21/2024.  She may return to work on 04/24/2024.       If you have any questions or concerns, please don't hesitate to call.      Gabe Aguilar MD"

## 2024-04-21 NOTE — FIRST PROVIDER EVALUATION
" Emergency Department TeleTriage Encounter Note      CHIEF COMPLAINT    Chief Complaint   Patient presents with    Facial Swelling     I think something got in my eye last night when I was walking; I woke up this morning and it looked like this       VITAL SIGNS   Initial Vitals [04/21/24 1512]   BP Pulse Resp Temp SpO2   130/89 77 18 98.1 °F (36.7 °C) 98 %      MAP       --            ALLERGIES    Review of patient's allergies indicates:   Allergen Reactions    Prochlorperazine Other (See Comments)     Other reaction(s): Muscle Pain, Unknown, Unknown  Pt reports drug as "Making my neck twitch".  neck twitching  "Makes her neck twitch"  Pt reports drug as "Making my neck twitch".  Pt reports drug as "Making my neck twitch".  "Makes her neck twitch"  Pt reports drug as "Making my neck twitch".      Oxycodone-acetaminophen Nausea Only and Other (See Comments)       PROVIDER TRIAGE NOTE  Patient reports something flew in her eye last night. Now upper and lower lids are swollen.       ORDERS  Labs Reviewed - No data to display    ED Orders (720h ago, onward)      None              Virtual Visit Note: The provider triage portion of this emergency department evaluation and documentation was performed via [employer], a HIPAA-compliant telemedicine application, in concert with a tele-presenter in the room. A face to face patient evaluation with one of my colleagues will occur once the patient is placed in an emergency department room.      DISCLAIMER: This note was prepared with [employer] voice recognition transcription software. Garbled syntax, mangled pronouns, and other bizarre constructions may be attributed to that software system.    "

## 2024-04-24 ENCOUNTER — HOSPITAL ENCOUNTER (OUTPATIENT)
Dept: RADIOLOGY | Facility: HOSPITAL | Age: 49
Discharge: HOME | End: 2024-04-24
Attending: OBSTETRICS & GYNECOLOGY
Payer: COMMERCIAL

## 2024-04-24 DIAGNOSIS — Z12.31 ENCOUNTER FOR SCREENING MAMMOGRAM FOR MALIGNANT NEOPLASM OF BREAST: ICD-10-CM

## 2024-04-24 PROCEDURE — 77067 SCR MAMMO BI INCL CAD: CPT | Mod: TC

## 2024-04-24 PROCEDURE — 77063 BREAST TOMOSYNTHESIS BI: CPT | Mod: 26,,, | Performed by: RADIOLOGY

## 2024-04-24 PROCEDURE — 77067 SCR MAMMO BI INCL CAD: CPT | Mod: 26,,, | Performed by: RADIOLOGY

## 2024-08-05 ENCOUNTER — PATIENT MESSAGE (OUTPATIENT)
Dept: ADMINISTRATIVE | Facility: HOSPITAL | Age: 49
End: 2024-08-05
Payer: COMMERCIAL

## 2024-11-18 ENCOUNTER — OFFICE VISIT (OUTPATIENT)
Dept: FAMILY MEDICINE | Facility: CLINIC | Age: 49
End: 2024-11-18
Payer: COMMERCIAL

## 2024-11-18 VITALS
TEMPERATURE: 99 F | WEIGHT: 145.63 LBS | DIASTOLIC BLOOD PRESSURE: 76 MMHG | HEART RATE: 72 BPM | HEIGHT: 65 IN | SYSTOLIC BLOOD PRESSURE: 110 MMHG | BODY MASS INDEX: 24.26 KG/M2 | OXYGEN SATURATION: 99 %

## 2024-11-18 DIAGNOSIS — Z11.59 NEED FOR HEPATITIS C SCREENING TEST: ICD-10-CM

## 2024-11-18 DIAGNOSIS — Z13.6 ENCOUNTER FOR LIPID SCREENING FOR CARDIOVASCULAR DISEASE: ICD-10-CM

## 2024-11-18 DIAGNOSIS — R73.03 PREDIABETES: ICD-10-CM

## 2024-11-18 DIAGNOSIS — Z11.4 SCREENING FOR HIV (HUMAN IMMUNODEFICIENCY VIRUS): ICD-10-CM

## 2024-11-18 DIAGNOSIS — Z12.11 SCREENING FOR COLON CANCER: ICD-10-CM

## 2024-11-18 DIAGNOSIS — Z13.220 ENCOUNTER FOR LIPID SCREENING FOR CARDIOVASCULAR DISEASE: ICD-10-CM

## 2024-11-18 DIAGNOSIS — Z00.00 ANNUAL PHYSICAL EXAM: Primary | ICD-10-CM

## 2024-11-18 PROCEDURE — 3008F BODY MASS INDEX DOCD: CPT | Mod: CPTII,S$GLB,, | Performed by: NURSE PRACTITIONER

## 2024-11-18 PROCEDURE — 3074F SYST BP LT 130 MM HG: CPT | Mod: CPTII,S$GLB,, | Performed by: NURSE PRACTITIONER

## 2024-11-18 PROCEDURE — 1160F RVW MEDS BY RX/DR IN RCRD: CPT | Mod: CPTII,S$GLB,, | Performed by: NURSE PRACTITIONER

## 2024-11-18 PROCEDURE — 99999 PR PBB SHADOW E&M-EST. PATIENT-LVL IV: CPT | Mod: PBBFAC,,, | Performed by: NURSE PRACTITIONER

## 2024-11-18 PROCEDURE — 3078F DIAST BP <80 MM HG: CPT | Mod: CPTII,S$GLB,, | Performed by: NURSE PRACTITIONER

## 2024-11-18 PROCEDURE — 1159F MED LIST DOCD IN RCRD: CPT | Mod: CPTII,S$GLB,, | Performed by: NURSE PRACTITIONER

## 2024-11-18 PROCEDURE — 99386 PREV VISIT NEW AGE 40-64: CPT | Mod: S$GLB,,, | Performed by: NURSE PRACTITIONER

## 2024-11-18 RX ORDER — NICOTINE POLACRILEX 2 MG
GUM BUCCAL
COMMUNITY

## 2024-11-18 NOTE — PROGRESS NOTES
SUBJECTIVE:      Patient ID: Elena Ingram is a 49 y.o. female.    Chief Complaint: Establish Care    History of Present Illness    CHIEF COMPLAINT:  Ms. Ingram presents for an annual wellness visit and to establish care with a new PCP after relocating from [place].    HPI:  Ms. Ingram reports recent foot swelling, particularly when wearing high heels and walking approximately half a block from work garage to job. Swelling is notable on dorsum of feet and progresses throughout the day. Ms. Ingram believes she may be entering perimenopause, noting decreased menstrual frequency to every 3 months. Ms. Ingram had a Pap smear in April of the current year at [facility] due to unspecified issues. A mammogram was performed this year, showing no evidence of malignancy. Ms. Ingram denies smoking, drug use, bowel issues, and family history of colon cancer.    MEDICAL HISTORY:  Ms. Ingram has a history of hemorrhoids. Ms. Ingram has not yet entered menopause. Her last Pap smear was performed in April 2023. Ms. Ingram has received two doses of the COVID-19 vaccine but has not received any booster shots.    TEST RESULTS:  Routine labs were conducted on June 18, 2023. A Pap smear was performed in April at [facility].    IMAGING:  Ms. Ingram underwent a mammogram in 2024, which was negative and showed no evidence of malignancy.    SOCIAL HISTORY:  Ms. Ingram drinks alcohol occasionally. She does not smoke or use drugs. She works as an . She exercises 3 days per week.       Review of Systems   Constitutional:  Negative for activity change, appetite change, chills, diaphoresis, fatigue, fever and unexpected weight change.   HENT:  Negative for congestion, ear pain, sinus pressure, sore throat, trouble swallowing and voice change.    Eyes:  Negative for pain, discharge and visual disturbance.   Respiratory:  Negative for cough, chest tightness, shortness of breath and wheezing.    Cardiovascular:  Negative  for chest pain and palpitations.   Gastrointestinal:  Negative for abdominal pain, constipation, diarrhea, nausea and vomiting.   Genitourinary:  Negative for difficulty urinating, flank pain, frequency and urgency.   Musculoskeletal:  Negative for back pain and joint swelling.        Feet swelling   Skin:  Negative for color change and rash.   Neurological:  Negative for dizziness, seizures, syncope, weakness, numbness and headaches.   Hematological:  Negative for adenopathy.   Psychiatric/Behavioral:  Negative for dysphoric mood and sleep disturbance. The patient is not nervous/anxious.        No family history on file.   Social History     Socioeconomic History    Marital status: Legally    Tobacco Use    Smoking status: Never    Smokeless tobacco: Never   Substance and Sexual Activity    Alcohol use: Yes     Comment: OCCASIONAL    Drug use: Never     Social Drivers of Health     Financial Resource Strain: Medium Risk (11/13/2024)    Overall Financial Resource Strain (CARDIA)     Difficulty of Paying Living Expenses: Somewhat hard   Food Insecurity: Food Insecurity Present (11/13/2024)    Hunger Vital Sign     Worried About Running Out of Food in the Last Year: Sometimes true     Ran Out of Food in the Last Year: Never true   Transportation Needs: No Transportation Needs (3/14/2023)    Received from [facility], [facility]    PRAPARE - Transportation     Lack of Transportation (Medical): No     Lack of Transportation (Non-Medical): No   Physical Activity: Insufficiently Active (11/13/2024)    Exercise Vital Sign     Days of Exercise per Week: 3 days     Minutes of Exercise per Session: 30 min   Stress: Stress Concern Present (11/13/2024)    [employer] - Occupational Stress Questionnaire     Feeling of Stress : To some extent   Housing Stability: Unknown (11/13/2024)    Housing Stability Vital Sign  "    Unable to Pay for Housing in the Last Year: No     Current Outpatient Medications   Medication Sig Dispense Refill    biotin 1 mg Cap Take by mouth.      Lactobacillus rhamnosus GG (CULTURELLE) 10 billion cell capsule Take 1 capsule by mouth once daily.      multivitamin with minerals tablet        No current facility-administered medications for this visit.     Facility-Administered Medications Ordered in Other Visits   Medication Dose Route Frequency Provider Last Rate Last Admin    diphenhydrAMINE injection 25 mg  25 mg Intravenous Q6H PRN Blaine Hernandez MD        electrolyte-S (ISOLYTE)   Intravenous Continuous Blaine Hernandez MD   Stopped at 04/01/24 1220    fentaNYL 50 mcg/mL injection 25 mcg  25 mcg Intravenous Q5 Min PRN Blaine Hernandez MD        HYDROmorphone (PF) injection 0.2 mg  0.2 mg Intravenous Q5 Min PRN Blaine Hernandez MD        lactated ringers infusion   Intravenous Continuous Blaine Hernandez MD        LIDOcaine (PF) 10 mg/ml (1%) injection 5 mg  0.5 mL Intradermal Once Blaine Hernandez MD        ondansetron injection 4 mg  4 mg Intravenous Once Blaine Hernandez MD         Review of patient's allergies indicates:   Allergen Reactions    Prochlorperazine Other (See Comments)     Other reaction(s): Muscle Pain, Unknown, Unknown  Pt reports drug as "Making my neck twitch".  neck twitching  "Makes her neck twitch"  Pt reports drug as "Making my neck twitch".  Pt reports drug as "Making my neck twitch".  "Makes her neck twitch"  Pt reports drug as "Making my neck twitch".        No past medical history on file.  Past Surgical History:   Procedure Laterality Date    DILATION AND CURETTAGE OF UTERUS      Essure Bilateral 2015    HYSTEROSCOPY WITH DILATION AND CURETTAGE OF UTERUS N/A 04/01/2024    Procedure: HYSTEROSCOPY, WITH DILATION AND CURETTAGE OF UTERUS and other indicated procedures;  Surgeon: Aquiles Martins MD;  Location: [facility];  Service: OB/GYN;  Laterality: " "N/A;          OBJECTIVE:      Vitals:    11/18/24 1047   BP: 110/76   BP Location: Left arm   Patient Position: Sitting   Pulse: 72   Temp: 98.6 °F (37 °C)   TempSrc: Oral   SpO2: 99%   Weight: 66 kg (145 lb 9.6 oz)   Height: 5' 5" (1.651 m)     Physical Exam  Vitals and nursing note reviewed.   Constitutional:       General: She is awake. She is not in acute distress.     Appearance: Normal appearance. She is well-developed, well-groomed and normal weight. She is not ill-appearing, toxic-appearing or diaphoretic.   HENT:      Head: Normocephalic and atraumatic.      Right Ear: Tympanic membrane, ear canal and external ear normal.      Left Ear: Tympanic membrane, ear canal and external ear normal.      Nose: Nose normal.      Mouth/Throat:      Lips: Pink.      Mouth: Mucous membranes are moist.      Tongue: Tongue does not deviate from midline.      Pharynx: Oropharynx is clear. Uvula midline.   Eyes:      General: Lids are normal. Gaze aligned appropriately.      Conjunctiva/sclera: Conjunctivae normal.      Right eye: Right conjunctiva is not injected.      Left eye: Left conjunctiva is not injected.      Pupils: Pupils are equal, round, and reactive to light.   Cardiovascular:      Rate and Rhythm: Normal rate and regular rhythm.      Pulses: Normal pulses.      Heart sounds: Normal heart sounds, S1 normal and S2 normal. No murmur heard.     No friction rub. No gallop.   Pulmonary:      Effort: Pulmonary effort is normal. No respiratory distress.      Breath sounds: Normal breath sounds. No stridor. No decreased breath sounds, wheezing, rhonchi or rales.   Abdominal:      General: Bowel sounds are normal. There is no distension.      Palpations: Abdomen is soft.      Tenderness: There is no abdominal tenderness. There is no guarding or rebound.   Musculoskeletal:      Cervical back: Neck supple.      Right lower leg: No edema.      Left lower leg: No edema.   Lymphadenopathy:      Cervical: No cervical " adenopathy.   Skin:     General: Skin is warm and dry.      Capillary Refill: Capillary refill takes less than 2 seconds.      Findings: No erythema or rash.   Neurological:      Mental Status: She is alert and oriented to person, place, and time. Mental status is at baseline.   Psychiatric:         Attention and Perception: Attention normal.         Mood and Affect: Mood normal.         Speech: Speech normal.         Behavior: Behavior normal. Behavior is cooperative.         Thought Content: Thought content normal.         Judgment: Judgment normal.            Assessment:       1. Annual physical exam    2. Prediabetes    3. Encounter for lipid screening for cardiovascular disease    4. Screening for HIV (human immunodeficiency virus)    5. Need for hepatitis C screening test    6. Screening for colon cancer        Plan:       Assessment & Plan    Established care for new patient recently moved from [place]  Reviewed medical history, medications, and lifestyle factors  Assessed need for routine labs and screenings  Evaluated menstrual cycle changes, potentially perimenopause  Considered colon cancer screening options (colonoscopy vs. Cologuard)  Noted recent mammogram with no evidence of malignancy  Assessed reported foot swelling, likely due to wearing heels    GENERAL ADULT MEDICAL EXAMINATION:  Counseled on age and gender appropriate medical preventative services, including cancer screenings, immunizations, overall nutritional health, need for a consistent exercise regimen and an overall push towards maintaining a vigorous and active lifestyle.    - Will start getting his routine care and overdue health maintenance completed.    - Advised patient about age and season appropriate immunizations/ cancer screenings. Influenza yearly and Tdap vaccine ever 10 years.    - Ordered routine labs with screenings, including 1-time screening for HIV and hepatitis C.  - Ordered annual physical exam.  - Weight is stable,  continue exercising.   - Follow up in 1 year for next annual exam.  - Return sooner if feeling sick or ill.  - Lab results will be messaged via patient portal.  - Contact office if cholesterol is elevated or adjustments needed based on lab results.  - Ms. Ingram to schedule fasting lab work, can be done as early as tomorrow.    COLON CANCER SCREENING:  - Explained colonoscopy procedure and Cologuard test as colon cancer screening options.    EDEMA:  - Discussed potential causes of foot swelling related to wearing heels and its impact on circulation.        Annual physical exam  -     Comprehensive Metabolic Panel; Future; Expected date: 11/18/2024  -     Lipid Panel; Future; Expected date: 11/18/2024  -     TSH; Future; Expected date: 11/18/2024  -     Hemoglobin A1C; Future; Expected date: 11/18/2024  -     Urinalysis; Future; Expected date: 11/18/2024    Prediabetes  -     Comprehensive Metabolic Panel; Future; Expected date: 11/18/2024  -     Lipid Panel; Future; Expected date: 11/18/2024  -     Hemoglobin A1C; Future; Expected date: 11/18/2024  -     Urinalysis; Future; Expected date: 11/18/2024    Encounter for lipid screening for cardiovascular disease  -     Lipid Panel; Future; Expected date: 11/18/2024    Screening for HIV (human immunodeficiency virus)  -     HIV 1/2 Ag/Ab (4th Gen); Future; Expected date: 11/18/2024    Need for hepatitis C screening test  -     Hepatitis C Antibody; Future; Expected date: 11/18/2024    Screening for colon cancer  -     Cologuard Screening (Multitarget Stool DNA); Future; Expected date: 11/18/2024    I spent a total of 20 minutes on the day of the visit.This includes face to face time and non-face to face time preparing to see the patient (eg, review of tests), obtaining and/or reviewing separately obtained history, documenting clinical information in the electronic or other health record, independently interpreting results and communicating results to the  patient/family/caregiver, or care coordinator.    Follow up in about 1 year (around 11/18/2025) for Annual Physical.          11/18/2024 SELENA Morales, JERONIMO    This note was generated with the assistance of ambient listening technology. Verbal consent was obtained by the patient and accompanying visitor(s) for the recording of patient appointment to facilitate this note. I attest to having reviewed and edited the generated note for accuracy, though some syntax or spelling errors may persist. Please contact the author of this note for any clarification.

## 2024-11-20 ENCOUNTER — LAB VISIT (OUTPATIENT)
Dept: LAB | Facility: HOSPITAL | Age: 49
End: 2024-11-20
Attending: NURSE PRACTITIONER
Payer: COMMERCIAL

## 2024-11-20 DIAGNOSIS — Z13.220 ENCOUNTER FOR LIPID SCREENING FOR CARDIOVASCULAR DISEASE: ICD-10-CM

## 2024-11-20 DIAGNOSIS — Z11.59 NEED FOR HEPATITIS C SCREENING TEST: ICD-10-CM

## 2024-11-20 DIAGNOSIS — R73.03 PREDIABETES: ICD-10-CM

## 2024-11-20 DIAGNOSIS — Z11.4 SCREENING FOR HIV (HUMAN IMMUNODEFICIENCY VIRUS): ICD-10-CM

## 2024-11-20 DIAGNOSIS — Z00.00 ANNUAL PHYSICAL EXAM: ICD-10-CM

## 2024-11-20 DIAGNOSIS — Z13.6 ENCOUNTER FOR LIPID SCREENING FOR CARDIOVASCULAR DISEASE: ICD-10-CM

## 2024-11-20 LAB
ALBUMIN SERPL BCP-MCNC: 4 G/DL (ref 3.5–5.2)
ALP SERPL-CCNC: 56 U/L (ref 40–150)
ALT SERPL W/O P-5'-P-CCNC: 18 U/L (ref 10–44)
ANION GAP SERPL CALC-SCNC: 11 MMOL/L (ref 8–16)
AST SERPL-CCNC: 18 U/L (ref 10–40)
BILIRUB SERPL-MCNC: 0.3 MG/DL (ref 0.1–1)
BILIRUB UR QL STRIP: NEGATIVE
BUN SERPL-MCNC: 16 MG/DL (ref 6–20)
CALCIUM SERPL-MCNC: 9.9 MG/DL (ref 8.7–10.5)
CHLORIDE SERPL-SCNC: 106 MMOL/L (ref 95–110)
CHOLEST SERPL-MCNC: 208 MG/DL (ref 120–199)
CHOLEST/HDLC SERPL: 3.6 {RATIO} (ref 2–5)
CLARITY UR REFRACT.AUTO: CLEAR
CO2 SERPL-SCNC: 22 MMOL/L (ref 23–29)
COLOR UR AUTO: YELLOW
CREAT SERPL-MCNC: 0.8 MG/DL (ref 0.5–1.4)
EST. GFR  (NO RACE VARIABLE): >60 ML/MIN/1.73 M^2
ESTIMATED AVG GLUCOSE: 120 MG/DL (ref 68–131)
GLUCOSE SERPL-MCNC: 89 MG/DL (ref 70–110)
GLUCOSE UR QL STRIP: NEGATIVE
HBA1C MFR BLD: 5.8 % (ref 4–5.6)
HCV AB SERPL QL IA: REACTIVE
HDLC SERPL-MCNC: 58 MG/DL (ref 40–75)
HDLC SERPL: 27.9 % (ref 20–50)
HGB UR QL STRIP: ABNORMAL
HIV 1+2 AB+HIV1 P24 AG SERPL QL IA: NORMAL
KETONES UR QL STRIP: NEGATIVE
LDLC SERPL CALC-MCNC: 136.4 MG/DL (ref 63–159)
LEUKOCYTE ESTERASE UR QL STRIP: NEGATIVE
NITRITE UR QL STRIP: NEGATIVE
NONHDLC SERPL-MCNC: 150 MG/DL
PH UR STRIP: 6 [PH] (ref 5–8)
POTASSIUM SERPL-SCNC: 4.3 MMOL/L (ref 3.5–5.1)
PROT SERPL-MCNC: 7.7 G/DL (ref 6–8.4)
PROT UR QL STRIP: NEGATIVE
SODIUM SERPL-SCNC: 139 MMOL/L (ref 136–145)
SP GR UR STRIP: 1.02 (ref 1–1.03)
TRIGL SERPL-MCNC: 68 MG/DL (ref 30–150)
TSH SERPL DL<=0.005 MIU/L-ACNC: 0.73 UIU/ML (ref 0.4–4)
URN SPEC COLLECT METH UR: ABNORMAL

## 2024-11-20 PROCEDURE — 86803 HEPATITIS C AB TEST: CPT | Performed by: NURSE PRACTITIONER

## 2024-11-20 PROCEDURE — 36415 COLL VENOUS BLD VENIPUNCTURE: CPT | Performed by: NURSE PRACTITIONER

## 2024-11-20 PROCEDURE — 80061 LIPID PANEL: CPT | Performed by: NURSE PRACTITIONER

## 2024-11-20 PROCEDURE — 87389 HIV-1 AG W/HIV-1&-2 AB AG IA: CPT | Performed by: NURSE PRACTITIONER

## 2024-11-20 PROCEDURE — 83036 HEMOGLOBIN GLYCOSYLATED A1C: CPT | Performed by: NURSE PRACTITIONER

## 2024-11-20 PROCEDURE — 84443 ASSAY THYROID STIM HORMONE: CPT | Performed by: NURSE PRACTITIONER

## 2024-11-20 PROCEDURE — 80053 COMPREHEN METABOLIC PANEL: CPT | Performed by: NURSE PRACTITIONER

## 2024-11-20 PROCEDURE — 81003 URINALYSIS AUTO W/O SCOPE: CPT | Performed by: NURSE PRACTITIONER

## 2024-11-21 ENCOUNTER — PATIENT MESSAGE (OUTPATIENT)
Dept: FAMILY MEDICINE | Facility: CLINIC | Age: 49
End: 2024-11-21
Payer: COMMERCIAL

## 2024-11-21 DIAGNOSIS — R76.8 POSITIVE HEPATITIS C ANTIBODY TEST: Primary | ICD-10-CM

## 2024-11-29 ENCOUNTER — LAB VISIT (OUTPATIENT)
Dept: LAB | Facility: HOSPITAL | Age: 49
End: 2024-11-29
Attending: NURSE PRACTITIONER
Payer: COMMERCIAL

## 2024-11-29 DIAGNOSIS — R76.8 POSITIVE HEPATITIS C ANTIBODY TEST: ICD-10-CM

## 2024-11-29 PROCEDURE — 36415 COLL VENOUS BLD VENIPUNCTURE: CPT | Performed by: NURSE PRACTITIONER

## 2024-11-29 PROCEDURE — 87522 HEPATITIS C REVRS TRNSCRPJ: CPT | Performed by: NURSE PRACTITIONER

## 2024-12-01 LAB
HCV PCR QNT TEST INFORMATION: NORMAL
HCV RNA SERPL NAA+PROBE-ACNC: NORMAL IU/ML

## 2024-12-02 ENCOUNTER — TELEPHONE (OUTPATIENT)
Dept: FAMILY MEDICINE | Facility: CLINIC | Age: 49
End: 2024-12-02
Payer: COMMERCIAL

## 2024-12-02 NOTE — TELEPHONE ENCOUNTER
----- Message from Tho Ribeiro sent at 12/2/2024  9:46 AM CST -----  Pt is returning clinton call     Pt # [phone]

## 2025-01-02 ENCOUNTER — PATIENT MESSAGE (OUTPATIENT)
Dept: ADMINISTRATIVE | Facility: HOSPITAL | Age: 50
End: 2025-01-02
Payer: COMMERCIAL

## 2025-03-26 ENCOUNTER — RESULTS FOLLOW-UP (OUTPATIENT)
Dept: FAMILY MEDICINE | Facility: CLINIC | Age: 50
End: 2025-03-26

## 2025-03-27 ENCOUNTER — OFFICE VISIT (OUTPATIENT)
Dept: FAMILY MEDICINE | Facility: CLINIC | Age: 50
End: 2025-03-27
Payer: COMMERCIAL

## 2025-03-27 VITALS
HEART RATE: 78 BPM | HEIGHT: 65 IN | OXYGEN SATURATION: 99 % | TEMPERATURE: 98 F | WEIGHT: 136.25 LBS | BODY MASS INDEX: 22.7 KG/M2 | SYSTOLIC BLOOD PRESSURE: 118 MMHG | DIASTOLIC BLOOD PRESSURE: 84 MMHG

## 2025-03-27 DIAGNOSIS — R10.12 LUQ PAIN: ICD-10-CM

## 2025-03-27 DIAGNOSIS — K64.9 HEMORRHOIDS, UNSPECIFIED HEMORRHOID TYPE: ICD-10-CM

## 2025-03-27 DIAGNOSIS — R10.9 ABDOMINAL CRAMPING: ICD-10-CM

## 2025-03-27 DIAGNOSIS — R19.8 TENESMUS (RECTAL): ICD-10-CM

## 2025-03-27 DIAGNOSIS — R10.32 LEFT LOWER QUADRANT PAIN: Primary | ICD-10-CM

## 2025-03-27 PROCEDURE — 3079F DIAST BP 80-89 MM HG: CPT | Mod: CPTII,S$GLB,, | Performed by: NURSE PRACTITIONER

## 2025-03-27 PROCEDURE — 99214 OFFICE O/P EST MOD 30 MIN: CPT | Mod: S$GLB,,, | Performed by: NURSE PRACTITIONER

## 2025-03-27 PROCEDURE — 1160F RVW MEDS BY RX/DR IN RCRD: CPT | Mod: CPTII,S$GLB,, | Performed by: NURSE PRACTITIONER

## 2025-03-27 PROCEDURE — 3074F SYST BP LT 130 MM HG: CPT | Mod: CPTII,S$GLB,, | Performed by: NURSE PRACTITIONER

## 2025-03-27 PROCEDURE — 3008F BODY MASS INDEX DOCD: CPT | Mod: CPTII,S$GLB,, | Performed by: NURSE PRACTITIONER

## 2025-03-27 PROCEDURE — 1159F MED LIST DOCD IN RCRD: CPT | Mod: CPTII,S$GLB,, | Performed by: NURSE PRACTITIONER

## 2025-03-27 PROCEDURE — 99999 PR PBB SHADOW E&M-EST. PATIENT-LVL IV: CPT | Mod: PBBFAC,,, | Performed by: NURSE PRACTITIONER

## 2025-03-27 RX ORDER — DICYCLOMINE HYDROCHLORIDE 20 MG/1
20 TABLET ORAL
Qty: 120 TABLET | Refills: 0 | Status: SHIPPED | OUTPATIENT
Start: 2025-03-27

## 2025-03-27 NOTE — PROGRESS NOTES
SUBJECTIVE:      Patient ID: Elena Ingram is a 50 y.o. female.    Chief Complaint: Abdominal Cramping (With urge to have a BM frequently )    History of Present Illness    CHIEF COMPLAINT:  Ms. Ingram presents with a persistent urge to have a bowel movement without actually needing to go, accompanied by abdominal pain and a hemorrhoid.    HPI:  Ms. Ingram reports a persistent urge to have a bowel movement without actually needing to go for about 2 weeks. This sensation is described as daily pressure in the rectum. She also has abdominal pain, generally felt throughout the abdomen but sometimes more pronounced on the left side or in the lower abdominal area. The pain is cramping and intermittent throughout the day.    She has one bowel movement per day, with stools of moderate consistency. There is no association between the abdominal pain and eating. She mentions having a hemorrhoid that protrudes and retracts, describing it as a small protrusion. The hemorrhoid does not cause pain when it protrudes. She has tried using Preparation H to treat the hemorrhoid without success.    She completed a cologuard test recently, which was initially sent in November but only completed recently due to relocation.    She denies rectal pain, diarrhea, constipation, burning with urination, urinary frequency, blood in urine, blood in stool, nausea, vomiting, and fever.    MEDICATIONS:  Ms. Ingram is using Preparation H for hemorrhoids.    MEDICAL HISTORY:  She has a history of internal and external hemorrhoids that protrude and retract.        Review of Systems   Constitutional:  Positive for activity change. Negative for appetite change, chills, diaphoresis, fatigue, fever and unexpected weight change.   HENT:  Negative for hearing loss, rhinorrhea and trouble swallowing.    Eyes:  Negative for discharge and visual disturbance.   Respiratory:  Negative for chest tightness and wheezing.    Cardiovascular:  Negative for chest  pain and palpitations.   Gastrointestinal:  Positive for abdominal pain. Negative for blood in stool, constipation, diarrhea and vomiting.        Urge to have bowel movement, hemorrhoids.    Endocrine: Negative for polydipsia and polyuria.   Genitourinary:  Negative for difficulty urinating, dysuria, hematuria and menstrual problem.   Musculoskeletal:  Negative for arthralgias, joint swelling and neck pain.   Neurological:  Negative for weakness and headaches.   Psychiatric/Behavioral:  Negative for confusion and dysphoric mood.        No family history on file.   Social History     Socioeconomic History    Marital status: Legally    Tobacco Use    Smoking status: Never    Smokeless tobacco: Never   Substance and Sexual Activity    Alcohol use: Yes     Comment: OCCASIONAL    Drug use: Never     Social Drivers of Health     Financial Resource Strain: Medium Risk (11/13/2024)    Overall Financial Resource Strain (CARDIA)     Difficulty of Paying Living Expenses: Somewhat hard   Food Insecurity: Food Insecurity Present (11/13/2024)    Hunger Vital Sign     Worried About Running Out of Food in the Last Year: Sometimes true     Ran Out of Food in the Last Year: Never true   Transportation Needs: No Transportation Needs (3/14/2023)    [facility] from [facility]    PRAPARE - Transportation     Lack of Transportation (Medical): No     Lack of Transportation (Non-Medical): No   Physical Activity: Insufficiently Active (11/13/2024)    Exercise Vital Sign     Days of Exercise per Week: 3 days     Minutes of Exercise per Session: 30 min   Stress: Stress Concern Present (11/13/2024)    [employer] [place] [employer] - Occupational Stress Questionnaire     Feeling of Stress : To some extent   Housing Stability: Unknown (11/13/2024)    Housing Stability Vital Sign     Unable to Pay for Housing in the Last Year: No     Current Outpatient Medications   Medication Sig    biotin  "1 mg Cap Take by mouth.    Lactobacillus rhamnosus GG (CULTURELLE) 10 billion cell capsule Take 1 capsule by mouth once daily.    multivitamin with minerals tablet     dicyclomine (BENTYL) 20 mg tablet Take 1 tablet (20 mg total) by mouth 4 (four) times daily before meals and nightly.   Last reviewed on 11/18/2024 11:27 AM by Soren Cedeño FNP-C    Review of patient's allergies indicates:   Allergen Reactions    Prochlorperazine Other (See Comments)     Other reaction(s): Muscle Pain, Unknown, Unknown  Pt reports drug as "Making my neck twitch".  neck twitching  "Makes her neck twitch"  Pt reports drug as "Making my neck twitch".  Pt reports drug as "Making my neck twitch".  "Makes her neck twitch"  Pt reports drug as "Making my neck twitch".        No past medical history on file.  Past Surgical History:   Procedure Laterality Date    DILATION AND CURETTAGE OF UTERUS      Essure Bilateral 2015    HYSTEROSCOPY WITH DILATION AND CURETTAGE OF UTERUS N/A 04/01/2024    Procedure: HYSTEROSCOPY, WITH DILATION AND CURETTAGE OF UTERUS and other indicated procedures;  Surgeon: Aquiles Martins MD;  Location: [facility];  Service: OB/GYN;  Laterality: N/A;          OBJECTIVE:      Vitals:    03/27/25 1024   BP: 118/84   BP Location: Left arm   Patient Position: Sitting   Pulse: 78   Temp: 98.4 °F (36.9 °C)   TempSrc: Oral   SpO2: 99%   Weight: 61.8 kg (136 lb 3.9 oz)   Height: 5' 5" (1.651 m)     Physical Exam  Vitals and nursing note reviewed. Exam conducted with a chaperone present.   Constitutional:       General: She is awake. She is not in acute distress.     Appearance: Normal appearance. She is well-developed, well-groomed and normal weight. She is not ill-appearing, toxic-appearing or diaphoretic.   HENT:      Head: Normocephalic and atraumatic.      Nose: Nose normal.   Eyes:      General: Lids are normal. Gaze aligned appropriately.      Conjunctiva/sclera: Conjunctivae normal.      Right eye: Right " conjunctiva is not injected.      Left eye: Left conjunctiva is not injected.      Pupils: Pupils are equal, round, and reactive to light.   Cardiovascular:      Rate and Rhythm: Normal rate and regular rhythm.      Pulses: Normal pulses.      Heart sounds: Normal heart sounds, S1 normal and S2 normal. No murmur heard.     No friction rub. No gallop.   Pulmonary:      Effort: Pulmonary effort is normal. No respiratory distress.      Breath sounds: Normal breath sounds. No stridor. No decreased breath sounds, wheezing, rhonchi or rales.   Abdominal:      General: There is no distension.      Palpations: Abdomen is soft.      Tenderness: There is abdominal tenderness in the periumbilical area, left upper quadrant and left lower quadrant. There is no guarding or rebound.   Genitourinary:     Rectum: Internal hemorrhoid present. No tenderness or external hemorrhoid.   Musculoskeletal:      Cervical back: Neck supple.      Right lower leg: No edema.      Left lower leg: No edema.   Lymphadenopathy:      Cervical: No cervical adenopathy.   Skin:     General: Skin is warm and dry.      Capillary Refill: Capillary refill takes less than 2 seconds.      Findings: No erythema or rash.   Neurological:      Mental Status: She is alert and oriented to person, place, and time. Mental status is at baseline.   Psychiatric:         Attention and Perception: Attention normal.         Mood and Affect: Mood normal.         Speech: Speech normal.         Behavior: Behavior normal. Behavior is cooperative.         Thought Content: Thought content normal.         Judgment: Judgment normal.            Assessment:       1. Left lower quadrant pain    2. LUQ pain    3. Abdominal cramping    4. Hemorrhoids, unspecified hemorrhoid type    5. Tenesmus (rectal)        Plan:       Assessment & Plan    PLAN SUMMARY:  - CT of abdomen and pelvis ordered to investigate abdominal pain and unusual digestive symptoms  - Prescribed Bentyl to be taken  before meals for abdominal cramping and pain  - Referred to Dr. Huntley in colorectal surgery for hemorrhoid evaluation and treatment  - Discussed options for hemorrhoid management    HEMORRHOIDS:  - Evaluated the patient for signs of rectal prolapse or protruding hemorrhoid.  - Noted that the patient reports having a hemorrhoid of noticeable size that protrudes and retracts.  - Performed a physical exam but did not observe a protruding hemorrhoid at the time.  - Referred the patient to Dr. Huntley in colorectal surgery for evaluation and potential treatment of hemorrhoids.    IRRITABLE BOWEL SYNDROME:  - Considered IBS as a potential cause for the sensation of needing to have a bowel movement without actual need.  - Noted that the patient reports experiencing abdominal pain and cramping that comes and goes for a few weeks.  - Suggested that the symptoms could be due to inflammation or IBS.  - Considered IBS or inflammation as potential causes for the sensation of needing to have a bowel movement without actual need.    DIVERTICULITIS:  - Considered diverticulitis as a possible cause of the patient's symptoms.  - Ordered a CT of the abdomen and pelvis to investigate for possible diverticulitis.    LOWER ABDOMINAL PAIN:  - Performed an abdominal exam, noting tenderness, particularly on the left side, with pain rated as 4/10 when pressed.  - Assessed abdominal tenderness, particularly on the left side.  - Noted that the patient reports lower abdominal pain and cramping, particularly on the left side, for a few weeks.  - Prescribed Bentyl to be taken before meals for management of abdominal cramping and pain.  - Ordered a CT of the abdomen and pelvis to investigate the cause of lower abdominal pain.    DIGESTIVE SYSTEM SYMPTOMS:  - Advised the patient that the hemorrhoid could potentially cause the sensation of needing to have a bowel movement, but questioned if it's the sole cause given the lack of relief when it  protrudes.  - Noted that the patient reports feeling the urge to have a bowel movement without actually needing to go, occurring daily for about 2 weeks.  - Confirmed that the patient reports having 1 bowel movement   per day and denies constipation.  - Performed a rectal exam and did not find any visible abnormalities that would explain the sensation.  - Considered various potential causes for the symptoms, including inflammation, IBS, or diverticulitis.  - Ordered a CT of the abdomen and pelvis to investigate the cause of the unusual digestive symptoms.  - Prescribed Bentyl to be taken before meals to help with abdominal symptoms.    FOLLOW-UP:  - CT department will contact patient to schedule.  - Follow up in 1-2 weeks for review and communication of recently completed COLA card test results.        Left lower quadrant pain  -     Cancel: CT Abdomen Pelvis  Without Contrast; Future; Expected date: 03/27/2025  -     CT Abdomen Pelvis With IV Contrast NO Oral Contrast; Future; Expected date: 03/27/2025    LUQ pain  -     Cancel: CT Abdomen Pelvis  Without Contrast; Future; Expected date: 03/27/2025  -     CT Abdomen Pelvis With IV Contrast NO Oral Contrast; Future; Expected date: 03/27/2025    Abdominal cramping  -     dicyclomine (BENTYL) 20 mg tablet; Take 1 tablet (20 mg total) by mouth 4 (four) times daily before meals and nightly.  Dispense: 120 tablet; Refill: 0    Hemorrhoids, unspecified hemorrhoid type  -     Ambulatory referral/consult to Colorectal Surgery; Future; Expected date: 04/03/2025    Tenesmus (rectal)  -     Cancel: CT Abdomen Pelvis  Without Contrast; Future; Expected date: 03/27/2025  -     Ambulatory referral/consult to Colorectal Surgery; Future; Expected date: 04/03/2025  -     CT Abdomen Pelvis With IV Contrast NO Oral Contrast; Future; Expected date: 03/27/2025    I spent a total of 23 minutes on the day of the visit.This includes face to face time and non-face to face time preparing to  see the patient (eg, review of tests), obtaining and/or reviewing separately obtained history, documenting clinical information in the electronic or other health record, independently interpreting results and communicating results to the patient/family/caregiver, or care coordinator.    Follow up if symptoms worsen or fail to improve.          3/27/2025 SELENA Morales, KIMBERLYP    This note was generated with the assistance of ambient listening technology. Verbal consent was obtained by the patient and accompanying visitor(s) for the recording of patient appointment to facilitate this note. I attest to having reviewed and edited the generated note for accuracy, though some syntax or spelling errors may persist. Please contact the author of this note for any clarification.

## 2025-04-03 ENCOUNTER — PATIENT MESSAGE (OUTPATIENT)
Dept: ADMINISTRATIVE | Facility: HOSPITAL | Age: 50
End: 2025-04-03
Payer: COMMERCIAL

## 2025-05-08 DIAGNOSIS — Z12.31 OTHER SCREENING MAMMOGRAM: ICD-10-CM

## 2025-06-24 ENCOUNTER — RESULTS FOLLOW-UP (OUTPATIENT)
Dept: FAMILY MEDICINE | Facility: CLINIC | Age: 50
End: 2025-06-24

## 2025-06-24 ENCOUNTER — HOSPITAL ENCOUNTER (OUTPATIENT)
Dept: RADIOLOGY | Facility: HOSPITAL | Age: 50
Discharge: HOME | End: 2025-06-24
Attending: NURSE PRACTITIONER
Payer: COMMERCIAL

## 2025-06-24 DIAGNOSIS — Z12.31 OTHER SCREENING MAMMOGRAM: ICD-10-CM

## 2025-06-24 PROCEDURE — 77067 SCR MAMMO BI INCL CAD: CPT | Mod: 26,,, | Performed by: RADIOLOGY

## 2025-06-24 PROCEDURE — 77063 BREAST TOMOSYNTHESIS BI: CPT | Mod: 26,,, | Performed by: RADIOLOGY

## 2025-06-24 PROCEDURE — 77063 BREAST TOMOSYNTHESIS BI: CPT | Mod: TC

## 2025-07-24 ENCOUNTER — OFFICE VISIT (OUTPATIENT)
Dept: FAMILY MEDICINE | Facility: CLINIC | Age: 50
End: 2025-07-24
Payer: COMMERCIAL

## 2025-07-24 ENCOUNTER — HOSPITAL ENCOUNTER (OUTPATIENT)
Dept: RADIOLOGY | Facility: HOSPITAL | Age: 50
Discharge: HOME | End: 2025-07-24
Attending: NURSE PRACTITIONER
Payer: COMMERCIAL

## 2025-07-24 VITALS
TEMPERATURE: 99 F | SYSTOLIC BLOOD PRESSURE: 120 MMHG | BODY MASS INDEX: 23.22 KG/M2 | OXYGEN SATURATION: 99 % | HEART RATE: 56 BPM | WEIGHT: 139.56 LBS | DIASTOLIC BLOOD PRESSURE: 70 MMHG

## 2025-07-24 DIAGNOSIS — L03.113 CELLULITIS OF RIGHT HAND: ICD-10-CM

## 2025-07-24 DIAGNOSIS — R20.2 PARESTHESIA: ICD-10-CM

## 2025-07-24 DIAGNOSIS — M79.641 RIGHT HAND PAIN: Primary | ICD-10-CM

## 2025-07-24 DIAGNOSIS — M79.641 RIGHT HAND PAIN: ICD-10-CM

## 2025-07-24 DIAGNOSIS — R22.31 LOCALIZED SWELLING ON RIGHT HAND: ICD-10-CM

## 2025-07-24 PROCEDURE — 99214 OFFICE O/P EST MOD 30 MIN: CPT | Mod: S$GLB,,, | Performed by: NURSE PRACTITIONER

## 2025-07-24 PROCEDURE — 73130 X-RAY EXAM OF HAND: CPT | Mod: TC,RT

## 2025-07-24 PROCEDURE — 73130 X-RAY EXAM OF HAND: CPT | Mod: 26,RT,, | Performed by: RADIOLOGY

## 2025-07-24 PROCEDURE — 3078F DIAST BP <80 MM HG: CPT | Mod: CPTII,S$GLB,, | Performed by: NURSE PRACTITIONER

## 2025-07-24 PROCEDURE — 99999 PR PBB SHADOW E&M-EST. PATIENT-LVL IV: CPT | Mod: PBBFAC,,, | Performed by: NURSE PRACTITIONER

## 2025-07-24 PROCEDURE — 3074F SYST BP LT 130 MM HG: CPT | Mod: CPTII,S$GLB,, | Performed by: NURSE PRACTITIONER

## 2025-07-24 PROCEDURE — G2211 COMPLEX E/M VISIT ADD ON: HCPCS | Mod: 95,S$GLB,, | Performed by: NURSE PRACTITIONER

## 2025-07-24 PROCEDURE — 1160F RVW MEDS BY RX/DR IN RCRD: CPT | Mod: CPTII,S$GLB,, | Performed by: NURSE PRACTITIONER

## 2025-07-24 PROCEDURE — 3008F BODY MASS INDEX DOCD: CPT | Mod: CPTII,S$GLB,, | Performed by: NURSE PRACTITIONER

## 2025-07-24 PROCEDURE — 1159F MED LIST DOCD IN RCRD: CPT | Mod: CPTII,S$GLB,, | Performed by: NURSE PRACTITIONER

## 2025-07-24 RX ORDER — DOXYCYCLINE HYCLATE 100 MG
100 TABLET ORAL 2 TIMES DAILY
Qty: 14 TABLET | Refills: 0 | Status: SHIPPED | OUTPATIENT
Start: 2025-07-24 | End: 2025-07-31

## 2025-07-24 RX ORDER — IBUPROFEN 800 MG/1
800 TABLET, FILM COATED ORAL 3 TIMES DAILY PRN
Qty: 15 TABLET | Refills: 0 | Status: SHIPPED | OUTPATIENT
Start: 2025-07-24

## 2025-07-24 NOTE — PROGRESS NOTES
SUBJECTIVE:      Patient ID: Elena Ingram is a 50 y.o. female.    Chief Complaint: Hand Pain (Hand swelling)    History of Present Illness    CHIEF COMPLAINT:  Ms. Ingram presents with pain, swelling, and redness in her right hand that started two days ago.    HPI:  Ms. Ingram reports spontaneous onset of pain and swelling in her right hand two days ago. The pain is localized to the dorsal aspect of the hand, tender to palpation, and exacerbated by movement or flexion. She also has paresthesia in her fingers, which began concurrently with the swelling. She describes radiating pain up her arm. Erythema on the dorsal aspect of her hand appeared today, even without manipulation. She is left hand dominant.     Initially, the hand was painful only with certain movements, but today it has progressed to include swelling, erythema, and paresthesia. She can form a , but it elicits pain. Her recent hand activity has been limited to using her mobile phone. She works as an  and uses a computer extensively throughout the day. She initially considered carpal tunnel syndrome but noted that her wrist is asymptomatic - the pain is exclusively in her hand.    This is the first occurrence of these symptoms for her. She denies any injury, impact, or twisting of the hand, lacerations or injuries to the hand, history of blood clots, rheumatoid arthritis, gout, or fever.    SOCIAL HISTORY:  Occupation: , works on computer all day        Review of Systems   Constitutional:  Negative for activity change, appetite change, chills, diaphoresis, fatigue, fever and unexpected weight change.   HENT:  Negative for congestion, ear pain, sinus pressure, sore throat, trouble swallowing and voice change.    Eyes:  Negative for pain, discharge and visual disturbance.   Respiratory:  Negative for cough, chest tightness, shortness of breath and wheezing.    Cardiovascular:  Negative for chest pain and palpitations.    Gastrointestinal:  Negative for abdominal pain, constipation, diarrhea, nausea and vomiting.   Genitourinary:  Negative for difficulty urinating, flank pain, frequency and urgency.   Musculoskeletal:  Negative for back pain and joint swelling.        Right hand swelling, tenderness, and erythema.    Skin:  Negative for color change and rash.   Neurological:  Negative for dizziness, seizures, syncope, weakness, numbness and headaches.   Hematological:  Negative for adenopathy.   Psychiatric/Behavioral:  Negative for dysphoric mood and sleep disturbance. The patient is not nervous/anxious.          No family history on file.   Social History     Socioeconomic History    Marital status: Legally    Tobacco Use    Smoking status: Never    Smokeless tobacco: Never   Substance and Sexual Activity    Alcohol use: Yes     Comment: OCCASIONAL    Drug use: Never     Social Drivers of Health     Financial Resource Strain: Low Risk  (7/24/2025)    Overall Financial Resource Strain (CARDIA)     Difficulty of Paying Living Expenses: Not very hard   Food Insecurity: Food Insecurity Present (7/24/2025)    Hunger Vital Sign     Worried About Running Out of Food in the Last Year: Sometimes true     Ran Out of Food in the Last Year: Sometimes true   Transportation Needs: No Transportation Needs (7/24/2025)    PRAPARE - Transportation     Lack of Transportation (Medical): No     Lack of Transportation (Non-Medical): No   Physical Activity: Insufficiently Active (7/24/2025)    Exercise Vital Sign     Days of Exercise per Week: 3 days     Minutes of Exercise per Session: 30 min   Stress: Stress Concern Present (7/24/2025)    [employer] - Occupational Stress Questionnaire     Feeling of Stress : To some extent   Housing Stability: Low Risk  (7/24/2025)    Housing Stability Vital Sign     Unable to Pay for Housing in the Last Year: No     Number of Times Moved in the Last Year: 0     Homeless in the  "Last Year: No     Current Outpatient Medications   Medication Sig    biotin 1 mg Cap Take by mouth.    multivitamin with minerals tablet     dicyclomine (BENTYL) 20 mg tablet Take 1 tablet (20 mg total) by mouth 4 (four) times daily before meals and nightly. (Patient not taking: Reported on 7/24/2025)    doxycycline (VIBRA-TABS) 100 MG tablet Take 1 tablet (100 mg total) by mouth 2 (two) times daily. for 7 days    ibuprofen (ADVIL,MOTRIN) 800 MG tablet Take 1 tablet (800 mg total) by mouth 3 (three) times daily as needed for Pain.    Lactobacillus rhamnosus GG (CULTURELLE) 10 billion cell capsule Take 1 capsule by mouth once daily. (Patient not taking: Reported on 7/24/2025)   Last reviewed on 7/24/2025  3:49 PM by Jerri Manley MA    Review of patient's allergies indicates:   Allergen Reactions    Prochlorperazine Other (See Comments)     Other reaction(s): Muscle Pain, Unknown, Unknown  Pt reports drug as "Making my neck twitch".  neck twitching  "Makes her neck twitch"  Pt reports drug as "Making my neck twitch".  Pt reports drug as "Making my neck twitch".  "Makes her neck twitch"  Pt reports drug as "Making my neck twitch".        No past medical history on file.  Past Surgical History:   Procedure Laterality Date    DILATION AND CURETTAGE OF UTERUS      Essure Bilateral 2015    HYSTEROSCOPY WITH DILATION AND CURETTAGE OF UTERUS N/A 04/01/2024    Procedure: HYSTEROSCOPY, WITH DILATION AND CURETTAGE OF UTERUS and other indicated procedures;  Surgeon: Aquiles Martins MD;  Location: [facility];  Service: OB/GYN;  Laterality: N/A;          OBJECTIVE:      Vitals:    07/24/25 1550   BP: 120/70   Pulse: (!) 56   Temp: 98.5 °F (36.9 °C)   TempSrc: Oral   SpO2: 99%   Weight: 63.3 kg (139 lb 8.8 oz)     Physical Exam  Vitals and nursing note reviewed.   Constitutional:       General: She is awake. She is not in acute distress.     Appearance: Normal appearance. She is well-developed, well-groomed and normal weight. " She is not ill-appearing, toxic-appearing or diaphoretic.   HENT:      Head: Normocephalic and atraumatic.      Nose: Nose normal.   Eyes:      General: Lids are normal. Gaze aligned appropriately.      Conjunctiva/sclera: Conjunctivae normal.      Right eye: Right conjunctiva is not injected.      Left eye: Left conjunctiva is not injected.      Pupils: Pupils are equal, round, and reactive to light.   Cardiovascular:      Rate and Rhythm: Normal rate and regular rhythm.      Pulses: Normal pulses.      Heart sounds: Normal heart sounds, S1 normal and S2 normal. No murmur heard.     No friction rub. No gallop.   Pulmonary:      Effort: Pulmonary effort is normal. No respiratory distress.      Breath sounds: Normal breath sounds. No stridor. No decreased breath sounds, wheezing, rhonchi or rales.   Chest:      Chest wall: No tenderness.   Musculoskeletal:      Right hand: Swelling and tenderness present. Decreased range of motion.        Hands:       Cervical back: Neck supple.      Right lower leg: No edema.      Left lower leg: No edema.      Comments: See imaging, +2 radial pulse, cap refill intact   Lymphadenopathy:      Cervical: No cervical adenopathy.   Skin:     General: Skin is warm and dry.      Capillary Refill: Capillary refill takes less than 2 seconds.      Findings: No erythema or rash.   Neurological:      Mental Status: She is alert and oriented to person, place, and time. Mental status is at baseline.   Psychiatric:         Attention and Perception: Attention normal.         Mood and Affect: Mood normal.         Speech: Speech normal.         Behavior: Behavior normal. Behavior is cooperative.         Thought Content: Thought content normal.         Judgment: Judgment normal.            Assessment:       1. Right hand pain    2. Localized swelling on right hand    3. Paresthesia    4. Cellulitis of right hand        Plan:       Assessment & Plan    PLAN SUMMARY:  - Apply ice to reduce swelling  -  "Prescribed ibuprofen as needed for pain and inflammation  - Prescribed antibiotics to be sent to [facility] on [address]  - Ordered labs: CBC, CMP, uric acid, Sed rate, and CRP  - Ordered x-ray of the right hand  - Ms. Ingram to send picture of hand through patient portal  - Contact office for test results, available by tomorrow    CELLULITIS OF RIGHT HAND:  - Assessed patient's right hand showing redness, swelling, and tenderness without apparent skin breaks or injury.  - While presentation is not entirely typical, cellulitis is being considered as a possible diagnosis.  - As a precautionary measure, prescribed a course of antibiotics to be sent to [facility] on [address].  - Ordered an x-ray of the right hand to examine soft tissues and bony structures for underlying issues.  - Patient was evaluated by Dr. Bullard, collaborating physician, who assisted with treatment plan.  - Ordered labs including CBC, CMP, uric acid, and CRP to rule out gout flare-up and assess inflammation markers.  - Blood clot in the hand is less likely.   - Recommend ice application to reduce swelling and prescribed ibuprofen as needed for pain and inflammation.  - Ms. Ingram instructed to send a picture of the hand through the patient portal to monitor progress.    PAIN IN RIGHT HAND AND SWELLING:  - Ms. Ingram reports right hand pain that began 2 days ago, particularly noticeable when moving or bending the hand.  - Examination revealed tenderness without skin breaks.  - Ibuprofen 800 mg prn pain.  - Apply ice prn.     PARESTHESIA OF SKIN:  - Ms. Ingram reports tingling sensation in fingers that feels like they are "asleep" but not severe.  - Explained that this paresthesia is likely due to nerve compression from the hand swelling rather than carpal tunnel syndrome, as there is no wrist pain reported.    FOLLOW-UP:  - Ms. Ingram should contact the office for test results, which should be available by tomorrow.        Right hand pain  -     X-Ray " Hand Complete Right; Future; Expected date: 07/24/2025  -     CBC Auto Differential; Future; Expected date: 07/24/2025  -     Comprehensive Metabolic Panel; Future; Expected date: 07/24/2025  -     Uric Acid; Future; Expected date: 07/24/2025  -     Sedimentation rate; Future; Expected date: 07/24/2025  -     C-Reactive Protein; Future; Expected date: 07/24/2025  -     doxycycline (VIBRA-TABS) 100 MG tablet; Take 1 tablet (100 mg total) by mouth 2 (two) times daily. for 7 days  Dispense: 14 tablet; Refill: 0  -     ibuprofen (ADVIL,MOTRIN) 800 MG tablet; Take 1 tablet (800 mg total) by mouth 3 (three) times daily as needed for Pain.  Dispense: 15 tablet; Refill: 0    Localized swelling on right hand  -     X-Ray Hand Complete Right; Future; Expected date: 07/24/2025  -     CBC Auto Differential; Future; Expected date: 07/24/2025  -     Comprehensive Metabolic Panel; Future; Expected date: 07/24/2025  -     Uric Acid; Future; Expected date: 07/24/2025  -     Sedimentation rate; Future; Expected date: 07/24/2025  -     C-Reactive Protein; Future; Expected date: 07/24/2025  -     doxycycline (VIBRA-TABS) 100 MG tablet; Take 1 tablet (100 mg total) by mouth 2 (two) times daily. for 7 days  Dispense: 14 tablet; Refill: 0  -     ibuprofen (ADVIL,MOTRIN) 800 MG tablet; Take 1 tablet (800 mg total) by mouth 3 (three) times daily as needed for Pain.  Dispense: 15 tablet; Refill: 0    Paresthesia    Cellulitis of right hand  -     X-Ray Hand Complete Right; Future; Expected date: 07/24/2025  -     CBC Auto Differential; Future; Expected date: 07/24/2025  -     Comprehensive Metabolic Panel; Future; Expected date: 07/24/2025  -     Uric Acid; Future; Expected date: 07/24/2025  -     Sedimentation rate; Future; Expected date: 07/24/2025  -     C-Reactive Protein; Future; Expected date: 07/24/2025  -     doxycycline (VIBRA-TABS) 100 MG tablet; Take 1 tablet (100 mg total) by mouth 2 (two) times daily. for 7 days  Dispense: 14  tablet; Refill: 0    I spent a total of 28 minutes on the day of the visit.This includes face to face time and non-face to face time preparing to see the patient (eg, review of tests), obtaining and/or reviewing separately obtained history, documenting clinical information in the electronic or other health record, independently interpreting results and communicating results to the patient/family/caregiver, or care coordinator.    Follow up in about 1 week (around 7/31/2025).          7/24/2025 SELENA Morales, EJRONIMO    This note was generated with the assistance of ambient listening technology. Verbal consent was obtained by the patient and accompanying visitor(s) for the recording of patient appointment to facilitate this note. I attest to having reviewed and edited the generated note for accuracy, though some syntax or spelling errors may persist. Please contact the author of this note for any clarification.

## (undated) DEVICE — PACK FLUENT DISPOSABLE

## (undated) DEVICE — TUBING SUC UNIV W/CONN 12FT

## (undated) DEVICE — GLOVE SENSICARE PI GRN 7.5

## (undated) DEVICE — SEAL LENS SCOPE MYOSURE

## (undated) DEVICE — Device

## (undated) DEVICE — SCRUB DYNA-HEX LIQ 4% CHG 4OZ

## (undated) DEVICE — JELLY SURGILUBE LUBE TUBE 2OZ

## (undated) DEVICE — DRAPE UINDERBUT GRAD PCH

## (undated) DEVICE — TOWEL OR DISP STRL BLUE 4/PK

## (undated) DEVICE — GOWN POLY REINF BRTH SLV XL

## (undated) DEVICE — CATH URETHRAL RED 16FR

## (undated) DEVICE — TRAY DRY SPONGE SCRUB W FOAM

## (undated) DEVICE — DEVICE MYOSURE LITE TISS REM

## (undated) DEVICE — PAD OB HS-77 STRL PREPACK 12S

## (undated) DEVICE — PACK SET UP 190 OMC-NS

## (undated) DEVICE — GOWN POLY REINF BRTH SLV 2XL

## (undated) DEVICE — GLOVE SENSICARE PI ALOE 7.5

## (undated) DEVICE — SOL NORMAL USPCA 0.9%

## (undated) DEVICE — STRAP OR TABLE 5IN X 72IN

## (undated) DEVICE — SOL NACL IRR 3000ML